# Patient Record
Sex: MALE | Race: WHITE | Employment: FULL TIME | URBAN - METROPOLITAN AREA
[De-identification: names, ages, dates, MRNs, and addresses within clinical notes are randomized per-mention and may not be internally consistent; named-entity substitution may affect disease eponyms.]

---

## 2020-03-25 ENCOUNTER — APPOINTMENT (OUTPATIENT)
Dept: GENERAL RADIOLOGY | Age: 45
DRG: 199 | End: 2020-03-25
Payer: COMMERCIAL

## 2020-03-25 ENCOUNTER — APPOINTMENT (OUTPATIENT)
Dept: CT IMAGING | Age: 45
DRG: 199 | End: 2020-03-25
Payer: COMMERCIAL

## 2020-03-25 ENCOUNTER — HOSPITAL ENCOUNTER (INPATIENT)
Age: 45
LOS: 4 days | Discharge: HOME | DRG: 199 | End: 2020-03-29
Attending: EMERGENCY MEDICINE | Admitting: SURGERY
Payer: COMMERCIAL

## 2020-03-25 PROBLEM — S42.302G: Status: ACTIVE | Noted: 2020-03-25

## 2020-03-25 PROBLEM — S22.42XD CLOSED FRACTURE OF MULTIPLE RIBS OF LEFT SIDE WITH ROUTINE HEALING: Status: ACTIVE | Noted: 2020-03-25

## 2020-03-25 PROBLEM — S22.41XD MULTIPLE FRACTURES OF RIBS OF RIGHT SIDE WITH ROUTINE HEALING: Status: ACTIVE | Noted: 2020-03-25

## 2020-03-25 PROBLEM — S22.49XG: Status: ACTIVE | Noted: 2020-03-25

## 2020-03-25 PROBLEM — S42.022A CLOSED DISPLACED FRACTURE OF SHAFT OF LEFT CLAVICLE: Status: ACTIVE | Noted: 2020-03-25

## 2020-03-25 LAB
ABO/RH: NORMAL
ALBUMIN SERPL-MCNC: 4.4 G/DL (ref 3.5–5.2)
ALP BLD-CCNC: 91 U/L (ref 40–129)
ALT SERPL-CCNC: 175 U/L (ref 0–40)
ANION GAP SERPL CALCULATED.3IONS-SCNC: 17 MMOL/L (ref 7–16)
ANTIBODY SCREEN: NORMAL
APTT: 30.9 SEC (ref 24.5–35.1)
AST SERPL-CCNC: 145 U/L (ref 0–39)
BASOPHILS ABSOLUTE: 0.09 E9/L (ref 0–0.2)
BASOPHILS RELATIVE PERCENT: 0.8 % (ref 0–2)
BILIRUB SERPL-MCNC: 0.4 MG/DL (ref 0–1.2)
BUN BLDV-MCNC: 8 MG/DL (ref 6–20)
CALCIUM SERPL-MCNC: 8.8 MG/DL (ref 8.6–10.2)
CHLORIDE BLD-SCNC: 105 MMOL/L (ref 98–107)
CO2: 21 MMOL/L (ref 22–29)
CREAT SERPL-MCNC: 1.1 MG/DL (ref 0.7–1.2)
EOSINOPHILS ABSOLUTE: 0.16 E9/L (ref 0.05–0.5)
EOSINOPHILS RELATIVE PERCENT: 1.4 % (ref 0–6)
GFR AFRICAN AMERICAN: >60
GFR NON-AFRICAN AMERICAN: >60 ML/MIN/1.73
GLUCOSE BLD-MCNC: 127 MG/DL (ref 74–99)
HCT VFR BLD CALC: 53.4 % (ref 37–54)
HEMOGLOBIN: 17.8 G/DL (ref 12.5–16.5)
IMMATURE GRANULOCYTES #: 0.16 E9/L
IMMATURE GRANULOCYTES %: 1.4 % (ref 0–5)
INR BLD: 1
LACTIC ACID: 2.6 MMOL/L (ref 0.5–2.2)
LIPASE: 38 U/L (ref 13–60)
LYMPHOCYTES ABSOLUTE: 3.8 E9/L (ref 1.5–4)
LYMPHOCYTES RELATIVE PERCENT: 33.6 % (ref 20–42)
MCH RBC QN AUTO: 32.1 PG (ref 26–35)
MCHC RBC AUTO-ENTMCNC: 33.3 % (ref 32–34.5)
MCV RBC AUTO: 96.4 FL (ref 80–99.9)
MONOCYTES ABSOLUTE: 0.72 E9/L (ref 0.1–0.95)
MONOCYTES RELATIVE PERCENT: 6.4 % (ref 2–12)
NEUTROPHILS ABSOLUTE: 6.37 E9/L (ref 1.8–7.3)
NEUTROPHILS RELATIVE PERCENT: 56.4 % (ref 43–80)
PDW BLD-RTO: 12.5 FL (ref 11.5–15)
PLATELET # BLD: 222 E9/L (ref 130–450)
PMV BLD AUTO: 10.4 FL (ref 7–12)
POTASSIUM SERPL-SCNC: 3.8 MMOL/L (ref 3.5–5)
PROTHROMBIN TIME: 11.4 SEC (ref 9.3–12.4)
RBC # BLD: 5.54 E12/L (ref 3.8–5.8)
SODIUM BLD-SCNC: 143 MMOL/L (ref 132–146)
TOTAL CK: 258 U/L (ref 20–200)
TOTAL PROTEIN: 7.2 G/DL (ref 6.4–8.3)
WBC # BLD: 11.3 E9/L (ref 4.5–11.5)

## 2020-03-25 PROCEDURE — 72128 CT CHEST SPINE W/O DYE: CPT

## 2020-03-25 PROCEDURE — G0480 DRUG TEST DEF 1-7 CLASSES: HCPCS

## 2020-03-25 PROCEDURE — 71260 CT THORAX DX C+: CPT

## 2020-03-25 PROCEDURE — 1200000000 HC SEMI PRIVATE

## 2020-03-25 PROCEDURE — 6360000002 HC RX W HCPCS: Performed by: EMERGENCY MEDICINE

## 2020-03-25 PROCEDURE — 94760 N-INVAS EAR/PLS OXIMETRY 1: CPT

## 2020-03-25 PROCEDURE — 96375 TX/PRO/DX INJ NEW DRUG ADDON: CPT

## 2020-03-25 PROCEDURE — 72125 CT NECK SPINE W/O DYE: CPT

## 2020-03-25 PROCEDURE — 85610 PROTHROMBIN TIME: CPT

## 2020-03-25 PROCEDURE — 72131 CT LUMBAR SPINE W/O DYE: CPT

## 2020-03-25 PROCEDURE — 82550 ASSAY OF CK (CPK): CPT

## 2020-03-25 PROCEDURE — 99291 CRITICAL CARE FIRST HOUR: CPT

## 2020-03-25 PROCEDURE — 86900 BLOOD TYPING SEROLOGIC ABO: CPT

## 2020-03-25 PROCEDURE — 85025 COMPLETE CBC W/AUTO DIFF WBC: CPT

## 2020-03-25 PROCEDURE — 2580000003 HC RX 258: Performed by: STUDENT IN AN ORGANIZED HEALTH CARE EDUCATION/TRAINING PROGRAM

## 2020-03-25 PROCEDURE — 6360000002 HC RX W HCPCS: Performed by: STUDENT IN AN ORGANIZED HEALTH CARE EDUCATION/TRAINING PROGRAM

## 2020-03-25 PROCEDURE — 83690 ASSAY OF LIPASE: CPT

## 2020-03-25 PROCEDURE — 6370000000 HC RX 637 (ALT 250 FOR IP): Performed by: SURGERY

## 2020-03-25 PROCEDURE — 6360000004 HC RX CONTRAST MEDICATION: Performed by: RADIOLOGY

## 2020-03-25 PROCEDURE — 74177 CT ABD & PELVIS W/CONTRAST: CPT

## 2020-03-25 PROCEDURE — 72170 X-RAY EXAM OF PELVIS: CPT

## 2020-03-25 PROCEDURE — 12002 RPR S/N/AX/GEN/TRNK2.6-7.5CM: CPT

## 2020-03-25 PROCEDURE — 73000 X-RAY EXAM OF COLLAR BONE: CPT

## 2020-03-25 PROCEDURE — 90715 TDAP VACCINE 7 YRS/> IM: CPT | Performed by: STUDENT IN AN ORGANIZED HEALTH CARE EDUCATION/TRAINING PROGRAM

## 2020-03-25 PROCEDURE — G0390 TRAUMA RESPONS W/HOSP CRITI: HCPCS

## 2020-03-25 PROCEDURE — 70450 CT HEAD/BRAIN W/O DYE: CPT

## 2020-03-25 PROCEDURE — 86901 BLOOD TYPING SEROLOGIC RH(D): CPT

## 2020-03-25 PROCEDURE — 90471 IMMUNIZATION ADMIN: CPT | Performed by: STUDENT IN AN ORGANIZED HEALTH CARE EDUCATION/TRAINING PROGRAM

## 2020-03-25 PROCEDURE — 83605 ASSAY OF LACTIC ACID: CPT

## 2020-03-25 PROCEDURE — 71045 X-RAY EXAM CHEST 1 VIEW: CPT

## 2020-03-25 PROCEDURE — 86850 RBC ANTIBODY SCREEN: CPT

## 2020-03-25 PROCEDURE — 80053 COMPREHEN METABOLIC PANEL: CPT

## 2020-03-25 PROCEDURE — 6360000002 HC RX W HCPCS: Performed by: SURGERY

## 2020-03-25 PROCEDURE — 96374 THER/PROPH/DIAG INJ IV PUSH: CPT

## 2020-03-25 PROCEDURE — 99285 EMERGENCY DEPT VISIT HI MDM: CPT

## 2020-03-25 PROCEDURE — 85730 THROMBOPLASTIN TIME PARTIAL: CPT

## 2020-03-25 PROCEDURE — 80307 DRUG TEST PRSMV CHEM ANLYZR: CPT

## 2020-03-25 PROCEDURE — 6830039000 HC L3 TRAUMA ALERT

## 2020-03-25 RX ORDER — 0.9 % SODIUM CHLORIDE 0.9 %
1000 INTRAVENOUS SOLUTION INTRAVENOUS ONCE
Status: COMPLETED | OUTPATIENT
Start: 2020-03-25 | End: 2020-03-25

## 2020-03-25 RX ORDER — HYDROMORPHONE HYDROCHLORIDE 1 MG/ML
1 INJECTION, SOLUTION INTRAMUSCULAR; INTRAVENOUS; SUBCUTANEOUS ONCE
Status: COMPLETED | OUTPATIENT
Start: 2020-03-25 | End: 2020-03-25

## 2020-03-25 RX ORDER — MORPHINE SULFATE 4 MG/ML
4 INJECTION, SOLUTION INTRAMUSCULAR; INTRAVENOUS
Status: DISCONTINUED | OUTPATIENT
Start: 2020-03-25 | End: 2020-03-27

## 2020-03-25 RX ORDER — KETOROLAC TROMETHAMINE 30 MG/ML
30 INJECTION, SOLUTION INTRAMUSCULAR; INTRAVENOUS EVERY 6 HOURS
Status: DISCONTINUED | OUTPATIENT
Start: 2020-03-25 | End: 2020-03-29 | Stop reason: HOSPADM

## 2020-03-25 RX ORDER — LIDOCAINE 4 G/G
1 PATCH TOPICAL EVERY 12 HOURS
Status: DISCONTINUED | OUTPATIENT
Start: 2020-03-25 | End: 2020-03-29 | Stop reason: HOSPADM

## 2020-03-25 RX ORDER — SODIUM CHLORIDE 0.9 % (FLUSH) 0.9 %
10 SYRINGE (ML) INJECTION EVERY 12 HOURS SCHEDULED
Status: DISCONTINUED | OUTPATIENT
Start: 2020-03-25 | End: 2020-03-29 | Stop reason: HOSPADM

## 2020-03-25 RX ORDER — FENTANYL CITRATE 50 UG/ML
50 INJECTION, SOLUTION INTRAMUSCULAR; INTRAVENOUS ONCE
Status: COMPLETED | OUTPATIENT
Start: 2020-03-25 | End: 2020-03-25

## 2020-03-25 RX ORDER — SODIUM CHLORIDE 0.9 % (FLUSH) 0.9 %
SYRINGE (ML) INJECTION
Status: DISPENSED
Start: 2020-03-25 | End: 2020-03-26

## 2020-03-25 RX ORDER — MORPHINE SULFATE 2 MG/ML
2 INJECTION, SOLUTION INTRAMUSCULAR; INTRAVENOUS
Status: DISCONTINUED | OUTPATIENT
Start: 2020-03-25 | End: 2020-03-27

## 2020-03-25 RX ORDER — OXYCODONE HYDROCHLORIDE 5 MG/1
10 TABLET ORAL EVERY 4 HOURS PRN
Status: DISCONTINUED | OUTPATIENT
Start: 2020-03-25 | End: 2020-03-29 | Stop reason: HOSPADM

## 2020-03-25 RX ORDER — OXYCODONE HYDROCHLORIDE 5 MG/1
5 TABLET ORAL EVERY 4 HOURS PRN
Status: DISCONTINUED | OUTPATIENT
Start: 2020-03-25 | End: 2020-03-29 | Stop reason: HOSPADM

## 2020-03-25 RX ORDER — SODIUM CHLORIDE 0.9 % (FLUSH) 0.9 %
10 SYRINGE (ML) INJECTION PRN
Status: DISCONTINUED | OUTPATIENT
Start: 2020-03-25 | End: 2020-03-29 | Stop reason: HOSPADM

## 2020-03-25 RX ORDER — MORPHINE SULFATE 10 MG/ML
8 INJECTION, SOLUTION INTRAMUSCULAR; INTRAVENOUS ONCE
Status: COMPLETED | OUTPATIENT
Start: 2020-03-25 | End: 2020-03-25

## 2020-03-25 RX ORDER — ONDANSETRON 2 MG/ML
4 INJECTION INTRAMUSCULAR; INTRAVENOUS EVERY 6 HOURS PRN
Status: DISCONTINUED | OUTPATIENT
Start: 2020-03-25 | End: 2020-03-29 | Stop reason: HOSPADM

## 2020-03-25 RX ORDER — IPRATROPIUM BROMIDE AND ALBUTEROL SULFATE 2.5; .5 MG/3ML; MG/3ML
1 SOLUTION RESPIRATORY (INHALATION) EVERY 4 HOURS PRN
Status: DISCONTINUED | OUTPATIENT
Start: 2020-03-25 | End: 2020-03-27

## 2020-03-25 RX ADMIN — IOPAMIDOL 80 ML: 755 INJECTION, SOLUTION INTRAVENOUS at 19:42

## 2020-03-25 RX ADMIN — MORPHINE SULFATE 4 MG: 4 INJECTION, SOLUTION INTRAMUSCULAR; INTRAVENOUS at 23:50

## 2020-03-25 RX ADMIN — FENTANYL CITRATE 50 MCG: 50 INJECTION, SOLUTION INTRAMUSCULAR; INTRAVENOUS at 19:02

## 2020-03-25 RX ADMIN — SODIUM CHLORIDE 1000 ML: 9 INJECTION, SOLUTION INTRAVENOUS at 19:03

## 2020-03-25 RX ADMIN — HYDROMORPHONE HYDROCHLORIDE 1 MG: 1 INJECTION, SOLUTION INTRAMUSCULAR; INTRAVENOUS; SUBCUTANEOUS at 21:29

## 2020-03-25 RX ADMIN — MORPHINE SULFATE 8 MG: 10 INJECTION INTRAVENOUS at 21:05

## 2020-03-25 RX ADMIN — OXYCODONE 10 MG: 5 TABLET ORAL at 22:30

## 2020-03-25 RX ADMIN — KETOROLAC TROMETHAMINE 30 MG: 30 INJECTION, SOLUTION INTRAMUSCULAR; INTRAVENOUS at 23:50

## 2020-03-25 RX ADMIN — TETANUS TOXOID, REDUCED DIPHTHERIA TOXOID AND ACELLULAR PERTUSSIS VACCINE, ADSORBED 0.5 ML: 5; 2.5; 8; 8; 2.5 SUSPENSION INTRAMUSCULAR at 19:09

## 2020-03-25 ASSESSMENT — ENCOUNTER SYMPTOMS
EYE REDNESS: 0
SINUS PRESSURE: 0
EYE DISCHARGE: 0
SORE THROAT: 0
VOMITING: 0
NAUSEA: 0
SHORTNESS OF BREATH: 0
COUGH: 0
DIARRHEA: 0
EYE PAIN: 0
ABDOMINAL PAIN: 0
BACK PAIN: 1
WHEEZING: 0

## 2020-03-25 ASSESSMENT — PAIN SCALES - GENERAL
PAINLEVEL_OUTOF10: 7
PAINLEVEL_OUTOF10: 10
PAINLEVEL_OUTOF10: 4
PAINLEVEL_OUTOF10: 10
PAINLEVEL_OUTOF10: 5
PAINLEVEL_OUTOF10: 10
PAINLEVEL_OUTOF10: 7

## 2020-03-25 ASSESSMENT — PAIN DESCRIPTION - ONSET: ONSET: GRADUAL

## 2020-03-25 ASSESSMENT — PAIN DESCRIPTION - ORIENTATION: ORIENTATION: LEFT

## 2020-03-25 ASSESSMENT — PAIN DESCRIPTION - PAIN TYPE: TYPE: ACUTE PAIN

## 2020-03-25 ASSESSMENT — PAIN - FUNCTIONAL ASSESSMENT: PAIN_FUNCTIONAL_ASSESSMENT: PREVENTS OR INTERFERES SOME ACTIVE ACTIVITIES AND ADLS

## 2020-03-25 ASSESSMENT — PAIN DESCRIPTION - PROGRESSION: CLINICAL_PROGRESSION: NOT CHANGED

## 2020-03-25 ASSESSMENT — PAIN DESCRIPTION - FREQUENCY: FREQUENCY: CONTINUOUS

## 2020-03-25 ASSESSMENT — PAIN DESCRIPTION - LOCATION: LOCATION: RIB CAGE

## 2020-03-25 NOTE — ED NOTES
Trauma Alert  Called  @ 0630   DR Kristine Ríos Chain CALL @ [address] [facility]  03/25/20 2765

## 2020-03-26 LAB
ACETAMINOPHEN LEVEL: <5 MCG/ML (ref 10–30)
ALBUMIN SERPL-MCNC: 4.4 G/DL (ref 3.5–5.2)
ALP BLD-CCNC: 84 U/L (ref 40–129)
ALT SERPL-CCNC: 140 U/L (ref 0–40)
AMPHETAMINE SCREEN, URINE: NOT DETECTED
ANION GAP SERPL CALCULATED.3IONS-SCNC: 13 MMOL/L (ref 7–16)
AST SERPL-CCNC: 86 U/L (ref 0–39)
BARBITURATE SCREEN URINE: NOT DETECTED
BENZODIAZEPINE SCREEN, URINE: NOT DETECTED
BILIRUB SERPL-MCNC: 1 MG/DL (ref 0–1.2)
BUN BLDV-MCNC: 11 MG/DL (ref 6–20)
CALCIUM SERPL-MCNC: 8.8 MG/DL (ref 8.6–10.2)
CANNABINOID SCREEN URINE: NOT DETECTED
CHLORIDE BLD-SCNC: 101 MMOL/L (ref 98–107)
CO2: 24 MMOL/L (ref 22–29)
COCAINE METABOLITE SCREEN URINE: NOT DETECTED
CREAT SERPL-MCNC: 1.1 MG/DL (ref 0.7–1.2)
ETHANOL: 159 MG/DL (ref 0–0.08)
FENTANYL SCREEN, URINE: POSITIVE
GFR AFRICAN AMERICAN: >60
GFR NON-AFRICAN AMERICAN: >60 ML/MIN/1.73
GLUCOSE BLD-MCNC: 150 MG/DL (ref 74–99)
HCT VFR BLD CALC: 50.7 % (ref 37–54)
HEMOGLOBIN: 16.9 G/DL (ref 12.5–16.5)
Lab: ABNORMAL
MCH RBC QN AUTO: 32.7 PG (ref 26–35)
MCHC RBC AUTO-ENTMCNC: 33.3 % (ref 32–34.5)
MCV RBC AUTO: 98.1 FL (ref 80–99.9)
METHADONE SCREEN, URINE: NOT DETECTED
OPIATE SCREEN URINE: POSITIVE
OXYCODONE URINE: POSITIVE
PDW BLD-RTO: 12.8 FL (ref 11.5–15)
PHENCYCLIDINE SCREEN URINE: NOT DETECTED
PLATELET # BLD: 196 E9/L (ref 130–450)
PMV BLD AUTO: 10.2 FL (ref 7–12)
POTASSIUM REFLEX MAGNESIUM: 4.9 MMOL/L (ref 3.5–5)
RBC # BLD: 5.17 E12/L (ref 3.8–5.8)
SALICYLATE, SERUM: <0.3 MG/DL (ref 0–30)
SODIUM BLD-SCNC: 138 MMOL/L (ref 132–146)
TOTAL PROTEIN: 6.9 G/DL (ref 6.4–8.3)
TRICYCLIC ANTIDEPRESSANTS SCREEN SERUM: NEGATIVE NG/ML
WBC # BLD: 12.9 E9/L (ref 4.5–11.5)

## 2020-03-26 PROCEDURE — 6360000002 HC RX W HCPCS: Performed by: SURGERY

## 2020-03-26 PROCEDURE — 99223 1ST HOSP IP/OBS HIGH 75: CPT | Performed by: SURGERY

## 2020-03-26 PROCEDURE — 80053 COMPREHEN METABOLIC PANEL: CPT

## 2020-03-26 PROCEDURE — 36415 COLL VENOUS BLD VENIPUNCTURE: CPT

## 2020-03-26 PROCEDURE — 97535 SELF CARE MNGMENT TRAINING: CPT

## 2020-03-26 PROCEDURE — 94640 AIRWAY INHALATION TREATMENT: CPT

## 2020-03-26 PROCEDURE — 97165 OT EVAL LOW COMPLEX 30 MIN: CPT

## 2020-03-26 PROCEDURE — 6370000000 HC RX 637 (ALT 250 FOR IP): Performed by: INTERNAL MEDICINE

## 2020-03-26 PROCEDURE — 2580000003 HC RX 258: Performed by: SURGERY

## 2020-03-26 PROCEDURE — 1200000000 HC SEMI PRIVATE

## 2020-03-26 PROCEDURE — 97530 THERAPEUTIC ACTIVITIES: CPT

## 2020-03-26 PROCEDURE — 97116 GAIT TRAINING THERAPY: CPT | Performed by: PHYSICAL THERAPIST

## 2020-03-26 PROCEDURE — 85027 COMPLETE CBC AUTOMATED: CPT

## 2020-03-26 PROCEDURE — 97530 THERAPEUTIC ACTIVITIES: CPT | Performed by: PHYSICAL THERAPIST

## 2020-03-26 PROCEDURE — 80307 DRUG TEST PRSMV CHEM ANLYZR: CPT

## 2020-03-26 PROCEDURE — 2700000000 HC OXYGEN THERAPY PER DAY

## 2020-03-26 PROCEDURE — 6370000000 HC RX 637 (ALT 250 FOR IP): Performed by: SURGERY

## 2020-03-26 PROCEDURE — 97161 PT EVAL LOW COMPLEX 20 MIN: CPT | Performed by: PHYSICAL THERAPIST

## 2020-03-26 RX ORDER — BACITRACIN, NEOMYCIN, POLYMYXIN B 400; 3.5; 5 [USP'U]/G; MG/G; [USP'U]/G
OINTMENT TOPICAL 2 TIMES DAILY
Status: DISCONTINUED | OUTPATIENT
Start: 2020-03-26 | End: 2020-03-27 | Stop reason: CLARIF

## 2020-03-26 RX ADMIN — KETOROLAC TROMETHAMINE 30 MG: 30 INJECTION, SOLUTION INTRAMUSCULAR; INTRAVENOUS at 22:23

## 2020-03-26 RX ADMIN — OXYCODONE 10 MG: 5 TABLET ORAL at 15:50

## 2020-03-26 RX ADMIN — MORPHINE SULFATE 4 MG: 4 INJECTION, SOLUTION INTRAMUSCULAR; INTRAVENOUS at 03:51

## 2020-03-26 RX ADMIN — OXYCODONE 10 MG: 5 TABLET ORAL at 06:53

## 2020-03-26 RX ADMIN — KETOROLAC TROMETHAMINE 30 MG: 30 INJECTION, SOLUTION INTRAMUSCULAR; INTRAVENOUS at 04:25

## 2020-03-26 RX ADMIN — BACITRACIN ZINC, NEOMYCIN SULFATE, POLYMYXIN B SULFATE: 3.5; 5000; 4 OINTMENT TOPICAL at 15:02

## 2020-03-26 RX ADMIN — KETOROLAC TROMETHAMINE 30 MG: 30 INJECTION, SOLUTION INTRAMUSCULAR; INTRAVENOUS at 16:09

## 2020-03-26 RX ADMIN — MORPHINE SULFATE 4 MG: 4 INJECTION, SOLUTION INTRAMUSCULAR; INTRAVENOUS at 12:39

## 2020-03-26 RX ADMIN — Medication 10 ML: at 10:03

## 2020-03-26 RX ADMIN — MORPHINE SULFATE 4 MG: 4 INJECTION, SOLUTION INTRAMUSCULAR; INTRAVENOUS at 09:07

## 2020-03-26 RX ADMIN — KETOROLAC TROMETHAMINE 30 MG: 30 INJECTION, SOLUTION INTRAMUSCULAR; INTRAVENOUS at 09:52

## 2020-03-26 RX ADMIN — IPRATROPIUM BROMIDE AND ALBUTEROL SULFATE 1 AMPULE: .5; 3 SOLUTION RESPIRATORY (INHALATION) at 06:54

## 2020-03-26 RX ADMIN — MORPHINE SULFATE 4 MG: 4 INJECTION, SOLUTION INTRAMUSCULAR; INTRAVENOUS at 21:42

## 2020-03-26 RX ADMIN — BACITRACIN ZINC, NEOMYCIN SULFATE, POLYMYXIN B SULFATE: 3.5; 5000; 4 OINTMENT TOPICAL at 22:26

## 2020-03-26 RX ADMIN — ENOXAPARIN SODIUM 40 MG: 40 INJECTION SUBCUTANEOUS at 09:53

## 2020-03-26 RX ADMIN — OXYCODONE 10 MG: 5 TABLET ORAL at 02:33

## 2020-03-26 RX ADMIN — Medication 10 ML: at 00:02

## 2020-03-26 ASSESSMENT — PAIN SCALES - GENERAL
PAINLEVEL_OUTOF10: 9
PAINLEVEL_OUTOF10: 2
PAINLEVEL_OUTOF10: 3
PAINLEVEL_OUTOF10: 8
PAINLEVEL_OUTOF10: 2
PAINLEVEL_OUTOF10: 10
PAINLEVEL_OUTOF10: 8
PAINLEVEL_OUTOF10: 10
PAINLEVEL_OUTOF10: 10
PAINLEVEL_OUTOF10: 8
PAINLEVEL_OUTOF10: 8
PAINLEVEL_OUTOF10: 2
PAINLEVEL_OUTOF10: 10
PAINLEVEL_OUTOF10: 10
PAINLEVEL_OUTOF10: 7

## 2020-03-26 ASSESSMENT — PAIN - FUNCTIONAL ASSESSMENT
PAIN_FUNCTIONAL_ASSESSMENT: PREVENTS OR INTERFERES SOME ACTIVE ACTIVITIES AND ADLS
PAIN_FUNCTIONAL_ASSESSMENT: PREVENTS OR INTERFERES SOME ACTIVE ACTIVITIES AND ADLS

## 2020-03-26 ASSESSMENT — PAIN DESCRIPTION - ONSET
ONSET: ON-GOING
ONSET: ON-GOING

## 2020-03-26 ASSESSMENT — PAIN DESCRIPTION - PROGRESSION
CLINICAL_PROGRESSION: GRADUALLY WORSENING
CLINICAL_PROGRESSION: GRADUALLY WORSENING

## 2020-03-26 ASSESSMENT — PAIN DESCRIPTION - DESCRIPTORS
DESCRIPTORS: ACHING;DISCOMFORT;THROBBING
DESCRIPTORS: ACHING;THROBBING

## 2020-03-26 ASSESSMENT — PAIN DESCRIPTION - LOCATION
LOCATION: GENERALIZED
LOCATION: RIB CAGE

## 2020-03-26 ASSESSMENT — PAIN DESCRIPTION - FREQUENCY
FREQUENCY: CONTINUOUS
FREQUENCY: CONTINUOUS

## 2020-03-26 ASSESSMENT — PAIN DESCRIPTION - PAIN TYPE
TYPE: ACUTE PAIN
TYPE: ACUTE PAIN
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
## 2020-03-26 NOTE — PROGRESS NOTES
Physical Therapy Initial Evaluation    Room #:  0316/0316-01  Patient Name: Rachel Marrufo  YOB: 1975  MRN: [id number]    Referring Provider: Hollie Britton MD    Date of Service: 3/26/2020    Evaluating Physical Therapist:  Bridget Gimenez, PT #[id number]      Diagnosis:   Multiple fractures of ribs of right side with routine healing [S22.41XD]  Closed multiple fractures of left upper extremity with ribs with delayed healing [S42.302G, S22.49XG]  Multiple fractures of ribs of right side with routine healing [S22.41XD]  Closed multiple fractures of left upper extremity with ribs with delayed healing [S42.302G, S22.49XG]        Patient Active Problem List   Diagnosis    Closed fracture of multiple ribs of left side with routine healing    Closed displaced fracture of shaft of left clavicle    Multiple fractures of ribs of right side with routine healing    Closed multiple fractures of left upper extremity with ribs with delayed healing        Tentative placement recommendation: Home Health Physical Therapy if needed    Equipment recommendation: Shower chair     Prior Level of Function: Patient ambulated independently works, drives  Rehab Potential: good  for baseline    Past medical history:   History reviewed. No pertinent past medical history. History reviewed. No pertinent surgical history.     Precautions: falls , NWB (non-weight bearing) Left upper extremity, sling   Comminuted, mildly displaced left mid clavicle fracture,   Fractures of the left second through seventh ribs with a small   hemothorax and left lung contusion         SUBJECTIVE:    Social history: Patient lives with spouse  in a tri level home bed/bath up 5 steps with3 steps  to enter with [employer] owned: None,       [address] [facility]   How much difficulty turning over in bed?: A Lot  How much difficulty sitting down on / standing up from a chair with arms?: A Little  How be administered as needed. Frequency of treatments: Patient will be seen  twice  daily  for therapeutic exercise, functional retraining, endurance activities, balance exercises, family and patient education. Time in  934  Time out  1016    Total Treatment Time  30 minutes    Evaluation time includes thorough review of current medical information, gathering information on past medical history/social history and prior level of function, completion of standardized testing/informal observation of tasks, assessment of data, and development of Plan of care and goals.      CPT codes:  Low Complexity PT evaluation (36334)  Therapeutic activities (42021)   20 minutes  1 unit(s)  Gait Training (10429) 10 minutes 1 unit(s)    Corry Gamez PT
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
## 2020-03-26 NOTE — PLAN OF CARE
Problem: Pain:  Description: Pain management should include both nonpharmacologic and pharmacologic interventions.   Goal: Pain level will decrease  Description: Pain level will decrease  3/26/2020 1742 by Nikita Galo RN  Outcome: Met This Shift  Note: Rate pain less than 5  3/26/2020 1334 by Asuncion Irene RN  Outcome: Met This Shift

## 2020-03-26 NOTE — PROGRESS NOTES
session. Cognition: A&O: 4/4; Follows 2 step directions              Memory:  Good              Sequencing:  Good              Problem solving:  Good              Judgement/safety:  Good                Functional Assessment:    Initial Eval Status  Date: 3/26/20 Treatment Status  Date: 3/26/2020 STGs = LTGs  Time frame: 5-7 days   Feeding Set up  Due to LUE precautions. N/T  Independent   Grooming Minimal Assist   Due to LUE precautions.  Mod A to assist in washing blood out of hair with foam soap and shampoo cap around, but not over staples Independent    UB Dressing Moderate Assist   Donned gown; however did not thread L arm. N/T  Independent    LB Dressing Maximal Assist   Don/doff socks. Pt unable to reach feet due to pain. N/T Independent    Bathing Moderate Assist  Using wipes while sitting EOB. N/T Independent   Toileting Moderate Assist     N/T Independent    Bed Mobility  Supine to sit: Moderate Assist   Sit to supine: NT   Pt in chair at end of session. HOB elevated. Min A to support B LE's to supine Supine to sit: Independent   Sit to supine: Independent    Functional Transfers Minimal Assist   Sit<>stand  Bed, chair Ryland for sit to stand from chair; transfer around foot of bed to opposite side of bed with Min A with height of bed elevated and HOB upright  Independent    Functional Mobility Minimal Assist   Without AD  40ft x 2; pt appearing diaphoretic after ~20 ft. Returned to room. 12 ft with  Min A with no AD; pt slightly dizzy upon standing Independent    Balance Sitting:     Static:  Good    Dynamic:Fair  Standing: Fair Sitting:     Static:  Good    Dynamic:Fair  Standing: Fair  Sitting:     Static:  Good    Dynamic:Good  Standing: Good   Activity Tolerance Fair  Pt on 3L O2; pt reports he is unable to take deep breaths due to rib fx.    SpO2 upper 80s on RA at rest. Fair/fair minus; pt demo's SOB and donned O2 upon RTB; nsg aware  Good   Visual/  Perceptual Glasses: No  WFL               Comments: Patient cleared by nursing staff. Upon arrival pt seated in chair. Pt educated with regards to bed mobility, hand placement, safety awareness, grooming tasks. At end of session pt RTB with HOB elevated  with all lines and tubes intact, call light within reach. Overall, pt demonstrated fair independence and safety during completion of ADL/functional transfers/mobility tasks. Pt would benefit from continued skilled OT to increase safety and independence with completion of ADL/IADL tasks for functional independence and quality of life.     · Pt has made fair/fair minus progress towards set goals as noted through :  · Grooming tasks, transfer training, bed mobility, functional mobility; pt limited d/t dizziness and pain   · Continue with current plan of care    Treatment Time In: 1:24PM      Treatment Time Out: 1:47 PM            Treatment Charges: Mins Units   ADL/Home Mgt     31542 15 1   Thera Activities     39408 8 1   Ther Ex                 11064     Manual Therapy    [id number]     Neuro Re-ed         19228     Orthotic manage/training                               32253     Non Billable Time     Total Timed Treatment 23 [address] [facility], [address] [place] [address]
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
## 2020-03-27 ENCOUNTER — APPOINTMENT (OUTPATIENT)
Dept: GENERAL RADIOLOGY | Age: 45
DRG: 199 | End: 2020-03-27
Payer: COMMERCIAL

## 2020-03-27 LAB
BASOPHILS ABSOLUTE: 0.06 E9/L (ref 0–0.2)
BASOPHILS RELATIVE PERCENT: 0.7 % (ref 0–2)
EOSINOPHILS ABSOLUTE: 0.2 E9/L (ref 0.05–0.5)
EOSINOPHILS RELATIVE PERCENT: 2.4 % (ref 0–6)
HCT VFR BLD CALC: 44.4 % (ref 37–54)
HEMOGLOBIN: 14.2 G/DL (ref 12.5–16.5)
IMMATURE GRANULOCYTES #: 0.04 E9/L
IMMATURE GRANULOCYTES %: 0.5 % (ref 0–5)
LYMPHOCYTES ABSOLUTE: 1.57 E9/L (ref 1.5–4)
LYMPHOCYTES RELATIVE PERCENT: 18.5 % (ref 20–42)
MCH RBC QN AUTO: 31.7 PG (ref 26–35)
MCHC RBC AUTO-ENTMCNC: 32 % (ref 32–34.5)
MCV RBC AUTO: 99.1 FL (ref 80–99.9)
MONOCYTES ABSOLUTE: 1.02 E9/L (ref 0.1–0.95)
MONOCYTES RELATIVE PERCENT: 12 % (ref 2–12)
NEUTROPHILS ABSOLUTE: 5.61 E9/L (ref 1.8–7.3)
NEUTROPHILS RELATIVE PERCENT: 65.9 % (ref 43–80)
PDW BLD-RTO: 12.6 FL (ref 11.5–15)
PLATELET # BLD: 147 E9/L (ref 130–450)
PMV BLD AUTO: 10.9 FL (ref 7–12)
RBC # BLD: 4.48 E12/L (ref 3.8–5.8)
WBC # BLD: 8.5 E9/L (ref 4.5–11.5)

## 2020-03-27 PROCEDURE — 85025 COMPLETE CBC W/AUTO DIFF WBC: CPT

## 2020-03-27 PROCEDURE — 6370000000 HC RX 637 (ALT 250 FOR IP): Performed by: INTERNAL MEDICINE

## 2020-03-27 PROCEDURE — 6370000000 HC RX 637 (ALT 250 FOR IP): Performed by: SURGERY

## 2020-03-27 PROCEDURE — 71045 X-RAY EXAM CHEST 1 VIEW: CPT

## 2020-03-27 PROCEDURE — 99233 SBSQ HOSP IP/OBS HIGH 50: CPT | Performed by: SURGERY

## 2020-03-27 PROCEDURE — 36415 COLL VENOUS BLD VENIPUNCTURE: CPT

## 2020-03-27 PROCEDURE — 97530 THERAPEUTIC ACTIVITIES: CPT

## 2020-03-27 PROCEDURE — 2700000000 HC OXYGEN THERAPY PER DAY

## 2020-03-27 PROCEDURE — 1200000000 HC SEMI PRIVATE

## 2020-03-27 PROCEDURE — 94640 AIRWAY INHALATION TREATMENT: CPT

## 2020-03-27 PROCEDURE — 2580000003 HC RX 258: Performed by: SURGERY

## 2020-03-27 PROCEDURE — 6360000002 HC RX W HCPCS: Performed by: SURGERY

## 2020-03-27 RX ORDER — BACITRACIN, NEOMYCIN, POLYMYXIN B 400; 3.5; 5 [USP'U]/G; MG/G; [USP'U]/G
OINTMENT TOPICAL 2 TIMES DAILY
Status: DISCONTINUED | OUTPATIENT
Start: 2020-03-27 | End: 2020-03-29 | Stop reason: HOSPADM

## 2020-03-27 RX ORDER — HYDROMORPHONE HYDROCHLORIDE 1 MG/ML
0.5 INJECTION, SOLUTION INTRAMUSCULAR; INTRAVENOUS; SUBCUTANEOUS
Status: DISCONTINUED | OUTPATIENT
Start: 2020-03-27 | End: 2020-03-29 | Stop reason: HOSPADM

## 2020-03-27 RX ORDER — IPRATROPIUM BROMIDE AND ALBUTEROL SULFATE 2.5; .5 MG/3ML; MG/3ML
1 SOLUTION RESPIRATORY (INHALATION)
Status: DISCONTINUED | OUTPATIENT
Start: 2020-03-27 | End: 2020-03-29 | Stop reason: HOSPADM

## 2020-03-27 RX ORDER — HYDROMORPHONE HYDROCHLORIDE 1 MG/ML
1 INJECTION, SOLUTION INTRAMUSCULAR; INTRAVENOUS; SUBCUTANEOUS
Status: DISCONTINUED | OUTPATIENT
Start: 2020-03-27 | End: 2020-03-29 | Stop reason: HOSPADM

## 2020-03-27 RX ORDER — METHOCARBAMOL 500 MG/1
1000 TABLET, FILM COATED ORAL EVERY 8 HOURS PRN
Status: DISCONTINUED | OUTPATIENT
Start: 2020-03-27 | End: 2020-03-29 | Stop reason: HOSPADM

## 2020-03-27 RX ADMIN — HYDROMORPHONE HYDROCHLORIDE 1 MG: 1 INJECTION, SOLUTION INTRAMUSCULAR; INTRAVENOUS; SUBCUTANEOUS at 19:30

## 2020-03-27 RX ADMIN — MORPHINE SULFATE 4 MG: 4 INJECTION, SOLUTION INTRAMUSCULAR; INTRAVENOUS at 07:56

## 2020-03-27 RX ADMIN — KETOROLAC TROMETHAMINE 30 MG: 30 INJECTION, SOLUTION INTRAMUSCULAR; INTRAVENOUS at 23:23

## 2020-03-27 RX ADMIN — HYDROMORPHONE HYDROCHLORIDE 1 MG: 1 INJECTION, SOLUTION INTRAMUSCULAR; INTRAVENOUS; SUBCUTANEOUS at 22:48

## 2020-03-27 RX ADMIN — ENOXAPARIN SODIUM 40 MG: 40 INJECTION SUBCUTANEOUS at 14:46

## 2020-03-27 RX ADMIN — HYDROMORPHONE HYDROCHLORIDE 1 MG: 1 INJECTION, SOLUTION INTRAMUSCULAR; INTRAVENOUS; SUBCUTANEOUS at 10:04

## 2020-03-27 RX ADMIN — KETOROLAC TROMETHAMINE 30 MG: 30 INJECTION, SOLUTION INTRAMUSCULAR; INTRAVENOUS at 09:37

## 2020-03-27 RX ADMIN — Medication 10 ML: at 08:53

## 2020-03-27 RX ADMIN — IPRATROPIUM BROMIDE AND ALBUTEROL SULFATE 1 AMPULE: 2.5; .5 SOLUTION RESPIRATORY (INHALATION) at 19:24

## 2020-03-27 RX ADMIN — KETOROLAC TROMETHAMINE 30 MG: 30 INJECTION, SOLUTION INTRAMUSCULAR; INTRAVENOUS at 04:44

## 2020-03-27 RX ADMIN — OXYCODONE 10 MG: 5 TABLET ORAL at 21:38

## 2020-03-27 RX ADMIN — BACITRACIN ZINC, NEOMYCIN SULFATE, POLYMYXIN B SULFATE 1 G: 3.5; 5000; 4 OINTMENT TOPICAL at 10:00

## 2020-03-27 RX ADMIN — Medication 10 ML: at 22:48

## 2020-03-27 RX ADMIN — SODIUM CHLORIDE, PRESERVATIVE FREE 10 ML: 5 INJECTION INTRAVENOUS at 10:05

## 2020-03-27 RX ADMIN — KETOROLAC TROMETHAMINE 30 MG: 30 INJECTION, SOLUTION INTRAMUSCULAR; INTRAVENOUS at 16:01

## 2020-03-27 RX ADMIN — METHOCARBAMOL 1000 MG: 500 TABLET, FILM COATED ORAL at 19:41

## 2020-03-27 RX ADMIN — BACITRACIN ZINC, NEOMYCIN, POLYMYXIN B: 400; 3.5; 5 OINTMENT TOPICAL at 19:41

## 2020-03-27 RX ADMIN — OXYCODONE 10 MG: 5 TABLET ORAL at 09:01

## 2020-03-27 RX ADMIN — OXYCODONE 10 MG: 5 TABLET ORAL at 17:38

## 2020-03-27 RX ADMIN — IPRATROPIUM BROMIDE AND ALBUTEROL SULFATE 1 AMPULE: 2.5; .5 SOLUTION RESPIRATORY (INHALATION) at 15:04

## 2020-03-27 RX ADMIN — HYDROMORPHONE HYDROCHLORIDE 1 MG: 1 INJECTION, SOLUTION INTRAMUSCULAR; INTRAVENOUS; SUBCUTANEOUS at 14:32

## 2020-03-27 ASSESSMENT — PAIN SCALES - GENERAL
PAINLEVEL_OUTOF10: 10
PAINLEVEL_OUTOF10: 9
PAINLEVEL_OUTOF10: 5
PAINLEVEL_OUTOF10: 5
PAINLEVEL_OUTOF10: 10
PAINLEVEL_OUTOF10: 0
PAINLEVEL_OUTOF10: 10
PAINLEVEL_OUTOF10: 10
PAINLEVEL_OUTOF10: 2
PAINLEVEL_OUTOF10: 10
PAINLEVEL_OUTOF10: 3
PAINLEVEL_OUTOF10: 3
PAINLEVEL_OUTOF10: 7
PAINLEVEL_OUTOF10: 2
PAINLEVEL_OUTOF10: 6
PAINLEVEL_OUTOF10: 8
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
## 2020-03-28 PROCEDURE — 6360000002 HC RX W HCPCS: Performed by: SURGERY

## 2020-03-28 PROCEDURE — 6370000000 HC RX 637 (ALT 250 FOR IP): Performed by: SURGERY

## 2020-03-28 PROCEDURE — 99233 SBSQ HOSP IP/OBS HIGH 50: CPT | Performed by: SURGERY

## 2020-03-28 PROCEDURE — 94640 AIRWAY INHALATION TREATMENT: CPT

## 2020-03-28 PROCEDURE — 2700000000 HC OXYGEN THERAPY PER DAY

## 2020-03-28 PROCEDURE — 6370000000 HC RX 637 (ALT 250 FOR IP): Performed by: INTERNAL MEDICINE

## 2020-03-28 PROCEDURE — 2580000003 HC RX 258: Performed by: SURGERY

## 2020-03-28 PROCEDURE — 1200000000 HC SEMI PRIVATE

## 2020-03-28 RX ADMIN — OXYCODONE 10 MG: 5 TABLET ORAL at 14:16

## 2020-03-28 RX ADMIN — METHOCARBAMOL 1000 MG: 500 TABLET, FILM COATED ORAL at 03:47

## 2020-03-28 RX ADMIN — BACITRACIN ZINC, NEOMYCIN, POLYMYXIN B: 400; 3.5; 5 OINTMENT TOPICAL at 14:16

## 2020-03-28 RX ADMIN — HYDROMORPHONE HYDROCHLORIDE 1 MG: 1 INJECTION, SOLUTION INTRAMUSCULAR; INTRAVENOUS; SUBCUTANEOUS at 19:27

## 2020-03-28 RX ADMIN — METHOCARBAMOL 1000 MG: 500 TABLET, FILM COATED ORAL at 15:49

## 2020-03-28 RX ADMIN — BACITRACIN ZINC, NEOMYCIN, POLYMYXIN B: 400; 3.5; 5 OINTMENT TOPICAL at 21:29

## 2020-03-28 RX ADMIN — KETOROLAC TROMETHAMINE 30 MG: 30 INJECTION, SOLUTION INTRAMUSCULAR; INTRAVENOUS at 17:41

## 2020-03-28 RX ADMIN — IPRATROPIUM BROMIDE AND ALBUTEROL SULFATE 1 AMPULE: 2.5; .5 SOLUTION RESPIRATORY (INHALATION) at 06:47

## 2020-03-28 RX ADMIN — HYDROMORPHONE HYDROCHLORIDE 1 MG: 1 INJECTION, SOLUTION INTRAMUSCULAR; INTRAVENOUS; SUBCUTANEOUS at 02:31

## 2020-03-28 RX ADMIN — OXYCODONE 10 MG: 5 TABLET ORAL at 18:22

## 2020-03-28 RX ADMIN — IPRATROPIUM BROMIDE AND ALBUTEROL SULFATE 1 AMPULE: 2.5; .5 SOLUTION RESPIRATORY (INHALATION) at 09:45

## 2020-03-28 RX ADMIN — KETOROLAC TROMETHAMINE 30 MG: 30 INJECTION, SOLUTION INTRAMUSCULAR; INTRAVENOUS at 11:12

## 2020-03-28 RX ADMIN — ENOXAPARIN SODIUM 40 MG: 40 INJECTION SUBCUTANEOUS at 11:11

## 2020-03-28 RX ADMIN — IPRATROPIUM BROMIDE AND ALBUTEROL SULFATE 1 AMPULE: 2.5; .5 SOLUTION RESPIRATORY (INHALATION) at 17:23

## 2020-03-28 RX ADMIN — Medication 10 ML: at 21:29

## 2020-03-28 RX ADMIN — IPRATROPIUM BROMIDE AND ALBUTEROL SULFATE 1 AMPULE: 2.5; .5 SOLUTION RESPIRATORY (INHALATION) at 13:27

## 2020-03-28 RX ADMIN — Medication 10 ML: at 11:12

## 2020-03-28 RX ADMIN — KETOROLAC TROMETHAMINE 30 MG: 30 INJECTION, SOLUTION INTRAMUSCULAR; INTRAVENOUS at 21:28

## 2020-03-28 RX ADMIN — HYDROMORPHONE HYDROCHLORIDE 0.5 MG: 1 INJECTION, SOLUTION INTRAMUSCULAR; INTRAVENOUS; SUBCUTANEOUS at 15:49

## 2020-03-28 RX ADMIN — OXYCODONE 10 MG: 5 TABLET ORAL at 01:38

## 2020-03-28 RX ADMIN — MAGNESIUM CITRATE 296 ML: 1.75 LIQUID ORAL at 17:41

## 2020-03-28 RX ADMIN — KETOROLAC TROMETHAMINE 30 MG: 30 INJECTION, SOLUTION INTRAMUSCULAR; INTRAVENOUS at 04:41

## 2020-03-28 ASSESSMENT — PAIN DESCRIPTION - DESCRIPTORS: DESCRIPTORS: SHARP;SHOOTING

## 2020-03-28 ASSESSMENT — PAIN SCALES - GENERAL
PAINLEVEL_OUTOF10: 4
PAINLEVEL_OUTOF10: 10
PAINLEVEL_OUTOF10: 8
PAINLEVEL_OUTOF10: 8
PAINLEVEL_OUTOF10: 3
PAINLEVEL_OUTOF10: 10
PAINLEVEL_OUTOF10: 4
PAINLEVEL_OUTOF10: 3
PAINLEVEL_OUTOF10: 6
PAINLEVEL_OUTOF10: 8
PAINLEVEL_OUTOF10: 3
PAINLEVEL_OUTOF10: 5
PAINLEVEL_OUTOF10: 10
PAINLEVEL_OUTOF10: 4

## 2020-03-28 ASSESSMENT — PAIN DESCRIPTION - PROGRESSION: CLINICAL_PROGRESSION: GRADUALLY WORSENING

## 2020-03-28 ASSESSMENT — PAIN - FUNCTIONAL ASSESSMENT: PAIN_FUNCTIONAL_ASSESSMENT: PREVENTS OR INTERFERES SOME ACTIVE ACTIVITIES AND ADLS

## 2020-03-28 ASSESSMENT — PAIN DESCRIPTION - FREQUENCY: FREQUENCY: CONTINUOUS

## 2020-03-28 ASSESSMENT — PAIN DESCRIPTION - PAIN TYPE: TYPE: ACUTE PAIN

## 2020-03-28 ASSESSMENT — PAIN DESCRIPTION - ONSET: ONSET: ON-GOING

## 2020-03-28 ASSESSMENT — PAIN DESCRIPTION - ORIENTATION: ORIENTATION: LEFT

## 2020-03-28 ASSESSMENT — PAIN DESCRIPTION - LOCATION: LOCATION: CHEST

## 2020-03-28 NOTE — PROGRESS NOTES
Pulse ox was 94% on room air at rest.  Ambulated patient on room air. Oxygen saturation was 84-87% on room air while ambulating. Oxygen applied. Recovery pulse ox was 94% on 2 liters of oxygen while ambulating.
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
## 2020-03-29 VITALS
HEIGHT: 74 IN | HEART RATE: 87 BPM | DIASTOLIC BLOOD PRESSURE: 80 MMHG | TEMPERATURE: 97.7 F | SYSTOLIC BLOOD PRESSURE: 156 MMHG | BODY MASS INDEX: 33.37 KG/M2 | RESPIRATION RATE: 22 BRPM | WEIGHT: 260 LBS | OXYGEN SATURATION: 91 %

## 2020-03-29 LAB
ALBUMIN SERPL-MCNC: 3.6 G/DL (ref 3.5–5.2)
ALP BLD-CCNC: 70 U/L (ref 40–129)
ALT SERPL-CCNC: 52 U/L (ref 0–40)
AST SERPL-CCNC: 30 U/L (ref 0–39)
BILIRUB SERPL-MCNC: 1.1 MG/DL (ref 0–1.2)
BILIRUBIN DIRECT: 0.3 MG/DL (ref 0–0.3)
BILIRUBIN, INDIRECT: 0.8 MG/DL (ref 0–1)
TOTAL PROTEIN: 6.5 G/DL (ref 6.4–8.3)

## 2020-03-29 PROCEDURE — 99239 HOSP IP/OBS DSCHRG MGMT >30: CPT | Performed by: SURGERY

## 2020-03-29 PROCEDURE — 6370000000 HC RX 637 (ALT 250 FOR IP): Performed by: SURGERY

## 2020-03-29 PROCEDURE — 94640 AIRWAY INHALATION TREATMENT: CPT

## 2020-03-29 PROCEDURE — 36415 COLL VENOUS BLD VENIPUNCTURE: CPT

## 2020-03-29 PROCEDURE — 80076 HEPATIC FUNCTION PANEL: CPT

## 2020-03-29 PROCEDURE — 2700000000 HC OXYGEN THERAPY PER DAY

## 2020-03-29 PROCEDURE — 6360000002 HC RX W HCPCS: Performed by: SURGERY

## 2020-03-29 PROCEDURE — 97116 GAIT TRAINING THERAPY: CPT

## 2020-03-29 PROCEDURE — 2580000003 HC RX 258: Performed by: SURGERY

## 2020-03-29 PROCEDURE — 6370000000 HC RX 637 (ALT 250 FOR IP): Performed by: INTERNAL MEDICINE

## 2020-03-29 RX ORDER — OXYCODONE HYDROCHLORIDE 5 MG/1
5 TABLET ORAL EVERY 6 HOURS PRN
Qty: 28 TABLET | Refills: 0 | Status: SHIPPED | OUTPATIENT
Start: 2020-03-29 | End: 2020-03-29 | Stop reason: HOSPADM

## 2020-03-29 RX ORDER — IBUPROFEN 800 MG/1
800 TABLET ORAL EVERY 6 HOURS PRN
Qty: 20 TABLET | Refills: 0 | Status: ON HOLD | OUTPATIENT
Start: 2020-03-29 | End: 2020-05-14 | Stop reason: HOSPADM

## 2020-03-29 RX ORDER — IBUPROFEN 800 MG/1
800 TABLET ORAL EVERY 6 HOURS PRN
Qty: 20 TABLET | Refills: 0 | Status: SHIPPED | OUTPATIENT
Start: 2020-03-29 | End: 2020-03-29 | Stop reason: HOSPADM

## 2020-03-29 RX ORDER — OXYCODONE HYDROCHLORIDE 5 MG/1
5 TABLET ORAL EVERY 6 HOURS PRN
Qty: 28 TABLET | Refills: 0 | Status: SHIPPED | OUTPATIENT
Start: 2020-03-29 | End: 2020-04-05

## 2020-03-29 RX ADMIN — IPRATROPIUM BROMIDE AND ALBUTEROL SULFATE 1 AMPULE: 2.5; .5 SOLUTION RESPIRATORY (INHALATION) at 05:04

## 2020-03-29 RX ADMIN — IPRATROPIUM BROMIDE AND ALBUTEROL SULFATE 1 AMPULE: 2.5; .5 SOLUTION RESPIRATORY (INHALATION) at 09:16

## 2020-03-29 RX ADMIN — KETOROLAC TROMETHAMINE 30 MG: 30 INJECTION, SOLUTION INTRAMUSCULAR; INTRAVENOUS at 04:17

## 2020-03-29 RX ADMIN — Medication 10 ML: at 10:47

## 2020-03-29 RX ADMIN — ENOXAPARIN SODIUM 40 MG: 40 INJECTION SUBCUTANEOUS at 10:46

## 2020-03-29 RX ADMIN — KETOROLAC TROMETHAMINE 30 MG: 30 INJECTION, SOLUTION INTRAMUSCULAR; INTRAVENOUS at 10:46

## 2020-03-29 ASSESSMENT — PAIN SCALES - GENERAL
PAINLEVEL_OUTOF10: 8
PAINLEVEL_OUTOF10: 4
PAINLEVEL_OUTOF10: 0

## 2020-03-29 NOTE — CARE COORDINATION
Patient to be discharged on home oxygen. Orders faxed to [facility] at [facility].  Per [employer], patient has not met deductible and may be responsible for out of pocket cost if hospital stay not billed first.  [facility] to contact patient to discuss with him. If patient approves, oxygen will be delivered. Charge nurse informed.  Electronically signed by Lani Liz RN on 3/29/2020 at 12:00 PM

## 2020-03-29 NOTE — PROGRESS NOTES
safety, pacing, and technique. 5 steps 1 rail with supervision    ROM Within functional limits except Left upper extremitiy     Increase range of motion 10% of affected joints    Strength BUE: refer to OT eval  RLE:  4/5  LLE:  4/5   Increase strength in affected mm groups by 1/3 grade   Balance Sitting EOB:  good    Dynamic Standing:  fair + Sitting EOB:  good   Dynamic Standing:  fair  Sitting EOB:  good    Dynamic Standing: good       Patient is Alert & Oriented x person, place, time and situation and follows directions    Vitals:  Blood Pressure at rest: Not assessed Blood Pressure post session: Not assessed   Heart Rate at rest: Not assessed Heart Rate post session: Not assessed   SPO2 at rest: 91% without nasal cannula SPO2 post session: 87-89% during exertion; 91% after treatment without nasal cannula     Patient education  Patient was educated and facilitated on techniques to increase safety and independence with bed mobility, balance, functional transfers, and functional mobility. Patient was explained the the benefits of mobility and risks of immobility. Patient response to education:   Pt verbalized understanding Pt demonstrated skill Pt requires further education in this area    Yes Yes Yes      Treatment: Patient practiced and was instructed in the following treatment:      Patient stood from chair and ambulated in hallway. Patient performed stairs and ambulated back to room. Therapeutic Exercises: not performed     At end of session, patient was in chair with call light and phone within reach, all lines were intact, and nursing was notified. ASSESSMENT:  Patient was able to progress ambulation distance and perform stairs with no loss of balance noted. Patient was motivated to participate but required slow movement to avoid increased pain. Patient SpO2 would also drop with exertion where he needed standing rest breaks.     Patient would benefit from continued skilled Physical Therapy to improve functional independence and quality of life. PLAN:    Patient is making good progress towards established goals, will continue with current plan of care.       Time in: 8:54  Time out: 9:18    Total Treatment Time: 24 minutes    CPT codes:  Gait Training (35922) 24 minutes 2 unit(s)    Tiffanie Jaffe PTA   LIC# [id number]

## 2020-03-29 NOTE — PLAN OF CARE
Problem: Pain:  Goal: Pain level will decrease  Description: Pain level will decrease  3/29/2020 0940 by Jesus Blankenship RN  Outcome: Met This Shift  3/28/2020 2043 by Danilo Santos RN  Outcome: Met This Shift  Goal: Control of acute pain  Description: Control of acute pain  3/29/2020 0940 by Jesus Blankenship RN  Outcome: Met This Shift  3/28/2020 2043 by Danilo Santos RN  Outcome: Met This Shift     Problem: Falls - Risk of:  Goal: Will remain free from falls  Description: Will remain free from falls  3/29/2020 0940 by Jesus Blankenship RN  Outcome: Met This Shift  3/28/2020 2043 by Danilo Santos RN  Outcome: Met This Shift  Goal: Absence of physical injury  Description: Absence of physical injury  3/29/2020 0940 by Jesus Blankenship RN  Outcome: Met This Shift  3/28/2020 2043 by Danilo Santos RN  Outcome: Met This Shift

## 2020-03-29 NOTE — DISCHARGE SUMMARY
tolerated  Diet: regular diet  Deep breathing and cough with pulmonary hygiene    Signed:  Bhavik Moe  3/29/2020  10:21 AM
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
## 2020-04-06 ENCOUNTER — OFFICE VISIT (OUTPATIENT)
Dept: ORTHOPEDIC SURGERY | Age: 45
End: 2020-04-06
Payer: COMMERCIAL

## 2020-04-06 VITALS — BODY MASS INDEX: 33.37 KG/M2 | WEIGHT: 260 LBS | HEIGHT: 74 IN | TEMPERATURE: 98 F

## 2020-04-06 PROCEDURE — 99213 OFFICE O/P EST LOW 20 MIN: CPT | Performed by: ORTHOPAEDIC SURGERY
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
## 2020-05-04 ENCOUNTER — OFFICE VISIT (OUTPATIENT)
Dept: ORTHOPEDIC SURGERY | Age: 45
End: 2020-05-04
Payer: COMMERCIAL

## 2020-05-04 VITALS — TEMPERATURE: 98 F | WEIGHT: 260 LBS | HEIGHT: 74 IN | BODY MASS INDEX: 33.37 KG/M2

## 2020-05-04 PROCEDURE — 99213 OFFICE O/P EST LOW 20 MIN: CPT | Performed by: ORTHOPAEDIC SURGERY

## 2020-05-13 ENCOUNTER — ANESTHESIA EVENT (OUTPATIENT)
Dept: OPERATING ROOM | Age: 45
End: 2020-05-13
Payer: COMMERCIAL

## 2020-05-13 NOTE — PROGRESS NOTES
[facility]        Date: 5/13/2020    Date of surgery:  5/14/2020   Arrival Time: Hospital will call you between 5pm and 7pm with your final arrival time for surgery    1. Do not eat or drink anything after  Midnight  prior to surgery. This includes no water, chewing gum, mints or ice chips. 2. Take the following medications with a small sip of water on the morning of Surgery:      3. Diabetics may take evening dose of insulin but none after midnight. If you feel symptomatic or low blood sugar morning of surgery drink 1-2 ounces of apple juice only. 4. Aspirin, Ibuprofen, Advil, Naproxen, Vitamin E and other Anti-inflammatory products should be stopped  before surgery  as directed by your physician. Take Tylenol only unless instructed otherwise by your surgeon. 5. Check with your Doctor regarding stopping Plavix, Coumadin, Lovenox, Eliquis, Effient, or other blood thinners. 6. Do not smoke,use illicit drugs and do not drink any alcoholic beverages 24 hours prior to surgery. 7. You may brush your teeth the morning of surgery. DO NOT SWALLOW WATER    8. You MUST make arrangements for a responsible adult to take you home after your surgery. You will not be allowed to leave alone or drive yourself home. It is strongly suggested someone stay with you the first 24 hrs. Your surgery will be cancelled if you do not have a ride home. 9. PEDIATRIC PATIENTS ONLY:  A parent/legal guardian must accompany a child scheduled for surgery and plan to stay at the hospital until the child is discharged. Please do not bring other children with you.     10. Please wear simple, loose fitting clothing to the hospital.  [facility] not bring valuables (money, credit cards, checkbooks, etc.) Do not wear any makeup (including no eye makeup) or nail polish on your fingers or toes. 11. DO NOT wear any jewelry or piercings on day of surgery. All body piercing jewelry must be removed. 12. Shower the night before surgery with __x_Antibacterial soap /LANE WIPES________    13. TOTAL JOINT REPLACEMENT/HYSTERECTOMY PATIENTS ONLY---Remember to bring Blood Bank bracelet to the hospital on the day of surgery. 14. If you have a Living Will and Durable Power of  for Healthcare, please bring in a copy. 15. If appropriate bring crutches, inspirex, WALKER, CANE etc... 12. Notify your Surgeon if you develop any illness between now and surgery time, cough, cold, fever, sore throat, nausea, vomiting, etc.  Please notify your surgeon if you experience dizziness, shortness of breath or blurred vision between now & the time of your surgery. 17. If you have ___dentures, they will be removed before going to the OR; we will provide you a container. If you wear ___contact lenses or ___glasses, they will be removed; please bring a case for them. 18. To provide excellent care visitors will be limited to 2 in the room at any given time. 19. Please bring picture ID and insurance card. 20. Sleep apnea patients need to bring CPAP AND SETTINGS to hospital on day of surgery. 21. During flu season no children under the age of 15 are permitted in the hospital for the safety of all patients. 22. Other                Please call AMBULATORY CARE if you have any further questions.    [address]     [address]

## 2020-05-14 ENCOUNTER — HOSPITAL ENCOUNTER (OUTPATIENT)
Dept: GENERAL RADIOLOGY | Age: 45
Setting detail: OUTPATIENT SURGERY
Discharge: HOME | End: 2020-05-16
Attending: ORTHOPAEDIC SURGERY
Payer: COMMERCIAL

## 2020-05-14 ENCOUNTER — ANESTHESIA (OUTPATIENT)
Dept: OPERATING ROOM | Age: 45
End: 2020-05-14
Payer: COMMERCIAL

## 2020-05-14 ENCOUNTER — HOSPITAL ENCOUNTER (OUTPATIENT)
Dept: GENERAL RADIOLOGY | Age: 45
Discharge: HOME | End: 2020-05-16
Payer: COMMERCIAL

## 2020-05-14 ENCOUNTER — HOSPITAL ENCOUNTER (OUTPATIENT)
Age: 45
Setting detail: OUTPATIENT SURGERY
Discharge: HOME | End: 2020-05-14
Attending: ORTHOPAEDIC SURGERY | Admitting: ORTHOPAEDIC SURGERY
Payer: COMMERCIAL

## 2020-05-14 VITALS
SYSTOLIC BLOOD PRESSURE: 129 MMHG | TEMPERATURE: 97.3 F | BODY MASS INDEX: 34.14 KG/M2 | RESPIRATION RATE: 16 BRPM | HEART RATE: 74 BPM | OXYGEN SATURATION: 94 % | WEIGHT: 266 LBS | HEIGHT: 74 IN | DIASTOLIC BLOOD PRESSURE: 67 MMHG

## 2020-05-14 VITALS
OXYGEN SATURATION: 90 % | RESPIRATION RATE: 2 BRPM | TEMPERATURE: 96.4 F | SYSTOLIC BLOOD PRESSURE: 111 MMHG | DIASTOLIC BLOOD PRESSURE: 70 MMHG

## 2020-05-14 PROCEDURE — 7100000010 HC PHASE II RECOVERY - FIRST 15 MIN: Performed by: ORTHOPAEDIC SURGERY

## 2020-05-14 PROCEDURE — 7100000001 HC PACU RECOVERY - ADDTL 15 MIN: Performed by: ORTHOPAEDIC SURGERY

## 2020-05-14 PROCEDURE — 3600000004 HC SURGERY LEVEL 4 BASE: Performed by: ORTHOPAEDIC SURGERY

## 2020-05-14 PROCEDURE — 7100000011 HC PHASE II RECOVERY - ADDTL 15 MIN: Performed by: ORTHOPAEDIC SURGERY

## 2020-05-14 PROCEDURE — 7100000000 HC PACU RECOVERY - FIRST 15 MIN: Performed by: ORTHOPAEDIC SURGERY

## 2020-05-14 PROCEDURE — 6360000002 HC RX W HCPCS

## 2020-05-14 PROCEDURE — C9359 IMPLNT,BON VOID FILLER-PUTTY: HCPCS | Performed by: ORTHOPAEDIC SURGERY

## 2020-05-14 PROCEDURE — L3650 SO 8 ABD RESTRAINT PRE OTS: HCPCS | Performed by: ORTHOPAEDIC SURGERY

## 2020-05-14 PROCEDURE — 2720000010 HC SURG SUPPLY STERILE: Performed by: ORTHOPAEDIC SURGERY

## 2020-05-14 PROCEDURE — 2500000003 HC RX 250 WO HCPCS: Performed by: ORTHOPAEDIC SURGERY

## 2020-05-14 PROCEDURE — C1713 ANCHOR/SCREW BN/BN,TIS/BN: HCPCS | Performed by: ORTHOPAEDIC SURGERY

## 2020-05-14 PROCEDURE — 2500000003 HC RX 250 WO HCPCS: Performed by: NURSE ANESTHETIST, CERTIFIED REGISTERED

## 2020-05-14 PROCEDURE — 3600000014 HC SURGERY LEVEL 4 ADDTL 15MIN: Performed by: ORTHOPAEDIC SURGERY

## 2020-05-14 PROCEDURE — 6360000002 HC RX W HCPCS: Performed by: NURSE ANESTHETIST, CERTIFIED REGISTERED

## 2020-05-14 PROCEDURE — 6360000002 HC RX W HCPCS: Performed by: ORTHOPAEDIC SURGERY

## 2020-05-14 PROCEDURE — 2709999900 HC NON-CHARGEABLE SUPPLY: Performed by: ORTHOPAEDIC SURGERY

## 2020-05-14 PROCEDURE — 73000 X-RAY EXAM OF COLLAR BONE: CPT

## 2020-05-14 PROCEDURE — 3700000001 HC ADD 15 MINUTES (ANESTHESIA): Performed by: ORTHOPAEDIC SURGERY

## 2020-05-14 PROCEDURE — 3700000000 HC ANESTHESIA ATTENDED CARE: Performed by: ORTHOPAEDIC SURGERY

## 2020-05-14 PROCEDURE — C1776 JOINT DEVICE (IMPLANTABLE): HCPCS | Performed by: ORTHOPAEDIC SURGERY

## 2020-05-14 PROCEDURE — 3209999900 FLUORO FOR SURGICAL PROCEDURES

## 2020-05-14 PROCEDURE — 2580000003 HC RX 258: Performed by: ANESTHESIOLOGY

## 2020-05-14 PROCEDURE — 2580000003 HC RX 258

## 2020-05-14 PROCEDURE — 2500000003 HC RX 250 WO HCPCS

## 2020-05-14 DEVICE — DBX PUTTY, 5CC
Type: IMPLANTABLE DEVICE | Site: CLAVICLE | Status: FUNCTIONAL
Brand: DBX®

## 2020-05-14 DEVICE — 3.5MM X 14MM NON-LOCKING HEXALOBE SCREW
Type: IMPLANTABLE DEVICE | Site: CLAVICLE | Status: FUNCTIONAL
Brand: ACUMED

## 2020-05-14 DEVICE — 3.5MM X 16MM NON-LOCKING HEXALOBE SCREW
Type: IMPLANTABLE DEVICE | Site: CLAVICLE | Status: FUNCTIONAL
Brand: ACUMED

## 2020-05-14 DEVICE — 3.5MM X 12MM LOCKING HEXALOBE SCREW
Type: IMPLANTABLE DEVICE | Site: CLAVICLE | Status: FUNCTIONAL
Brand: ACUMED

## 2020-05-14 DEVICE — 3.5MM X 16MM LOCKING HEXALOBE SCREW
Type: IMPLANTABLE DEVICE | Site: CLAVICLE | Status: FUNCTIONAL
Brand: ACUMED

## 2020-05-14 DEVICE — 3.5MM X 14MM LOCKING HEXALOBE SCREW
Type: IMPLANTABLE DEVICE | Site: CLAVICLE | Status: FUNCTIONAL
Brand: ACUMED

## 2020-05-14 DEVICE — LOW PROF CLAVICLE PLATE, 10-HOLE RT
Type: IMPLANTABLE DEVICE | Site: CLAVICLE | Status: FUNCTIONAL
Brand: ACUMED

## 2020-05-14 RX ORDER — BUPIVACAINE HYDROCHLORIDE AND EPINEPHRINE 2.5; 5 MG/ML; UG/ML
INJECTION, SOLUTION EPIDURAL; INFILTRATION; INTRACAUDAL; PERINEURAL PRN
Status: DISCONTINUED | OUTPATIENT
Start: 2020-05-14 | End: 2020-05-14 | Stop reason: ALTCHOICE

## 2020-05-14 RX ORDER — PROPOFOL 10 MG/ML
INJECTION, EMULSION INTRAVENOUS PRN
Status: DISCONTINUED | OUTPATIENT
Start: 2020-05-14 | End: 2020-05-14 | Stop reason: SDUPTHER

## 2020-05-14 RX ORDER — GLYCOPYRROLATE 1 MG/5 ML
SYRINGE (ML) INTRAVENOUS PRN
Status: DISCONTINUED | OUTPATIENT
Start: 2020-05-14 | End: 2020-05-14 | Stop reason: SDUPTHER

## 2020-05-14 RX ORDER — LABETALOL HYDROCHLORIDE 5 MG/ML
INJECTION, SOLUTION INTRAVENOUS PRN
Status: DISCONTINUED | OUTPATIENT
Start: 2020-05-14 | End: 2020-05-14 | Stop reason: SDUPTHER

## 2020-05-14 RX ORDER — MEPERIDINE HYDROCHLORIDE 25 MG/ML
12.5 INJECTION INTRAMUSCULAR; INTRAVENOUS; SUBCUTANEOUS EVERY 5 MIN PRN
Status: DISCONTINUED | OUTPATIENT
Start: 2020-05-14 | End: 2020-05-14 | Stop reason: HOSPADM

## 2020-05-14 RX ORDER — DEXAMETHASONE SODIUM PHOSPHATE 10 MG/ML
INJECTION, SOLUTION INTRAMUSCULAR; INTRAVENOUS PRN
Status: DISCONTINUED | OUTPATIENT
Start: 2020-05-14 | End: 2020-05-14 | Stop reason: SDUPTHER

## 2020-05-14 RX ORDER — SODIUM CHLORIDE 0.9 % (FLUSH) 0.9 %
10 SYRINGE (ML) INJECTION EVERY 12 HOURS SCHEDULED
Status: DISCONTINUED | OUTPATIENT
Start: 2020-05-14 | End: 2020-05-14 | Stop reason: HOSPADM

## 2020-05-14 RX ORDER — SODIUM CHLORIDE, SODIUM LACTATE, POTASSIUM CHLORIDE, CALCIUM CHLORIDE 600; 310; 30; 20 MG/100ML; MG/100ML; MG/100ML; MG/100ML
INJECTION, SOLUTION INTRAVENOUS CONTINUOUS PRN
Status: DISCONTINUED | OUTPATIENT
Start: 2020-05-14 | End: 2020-05-14 | Stop reason: SDUPTHER

## 2020-05-14 RX ORDER — HYDROCODONE BITARTRATE AND ACETAMINOPHEN 5; 325 MG/1; MG/1
1 TABLET ORAL EVERY 4 HOURS PRN
Qty: 42 TABLET | Refills: 0 | Status: SHIPPED | OUTPATIENT
Start: 2020-05-14 | End: 2020-05-21

## 2020-05-14 RX ORDER — MORPHINE SULFATE 1 MG/ML
INJECTION, SOLUTION EPIDURAL; INTRATHECAL; INTRAVENOUS PRN
Status: DISCONTINUED | OUTPATIENT
Start: 2020-05-14 | End: 2020-05-14 | Stop reason: SDUPTHER

## 2020-05-14 RX ORDER — NEOSTIGMINE METHYLSULFATE 1 MG/ML
INJECTION, SOLUTION INTRAVENOUS PRN
Status: DISCONTINUED | OUTPATIENT
Start: 2020-05-14 | End: 2020-05-14 | Stop reason: SDUPTHER

## 2020-05-14 RX ORDER — LIDOCAINE HYDROCHLORIDE 20 MG/ML
INJECTION, SOLUTION INTRAVENOUS PRN
Status: DISCONTINUED | OUTPATIENT
Start: 2020-05-14 | End: 2020-05-14 | Stop reason: SDUPTHER

## 2020-05-14 RX ORDER — ONDANSETRON 2 MG/ML
4 INJECTION INTRAMUSCULAR; INTRAVENOUS
Status: DISCONTINUED | OUTPATIENT
Start: 2020-05-14 | End: 2020-05-14 | Stop reason: HOSPADM

## 2020-05-14 RX ORDER — SODIUM CHLORIDE, SODIUM LACTATE, POTASSIUM CHLORIDE, CALCIUM CHLORIDE 600; 310; 30; 20 MG/100ML; MG/100ML; MG/100ML; MG/100ML
INJECTION, SOLUTION INTRAVENOUS CONTINUOUS
Status: DISCONTINUED | OUTPATIENT
Start: 2020-05-14 | End: 2020-05-14 | Stop reason: HOSPADM

## 2020-05-14 RX ORDER — PROMETHAZINE HYDROCHLORIDE 25 MG/ML
6.25 INJECTION, SOLUTION INTRAMUSCULAR; INTRAVENOUS
Status: DISCONTINUED | OUTPATIENT
Start: 2020-05-14 | End: 2020-05-14 | Stop reason: HOSPADM

## 2020-05-14 RX ORDER — ROCURONIUM BROMIDE 10 MG/ML
INJECTION, SOLUTION INTRAVENOUS PRN
Status: DISCONTINUED | OUTPATIENT
Start: 2020-05-14 | End: 2020-05-14 | Stop reason: SDUPTHER

## 2020-05-14 RX ORDER — FENTANYL CITRATE 50 UG/ML
INJECTION, SOLUTION INTRAMUSCULAR; INTRAVENOUS PRN
Status: DISCONTINUED | OUTPATIENT
Start: 2020-05-14 | End: 2020-05-14 | Stop reason: SDUPTHER

## 2020-05-14 RX ORDER — MIDAZOLAM HYDROCHLORIDE 1 MG/ML
INJECTION INTRAMUSCULAR; INTRAVENOUS PRN
Status: DISCONTINUED | OUTPATIENT
Start: 2020-05-14 | End: 2020-05-14 | Stop reason: SDUPTHER

## 2020-05-14 RX ORDER — LABETALOL HYDROCHLORIDE 5 MG/ML
5 INJECTION, SOLUTION INTRAVENOUS EVERY 10 MIN PRN
Status: DISCONTINUED | OUTPATIENT
Start: 2020-05-14 | End: 2020-05-14 | Stop reason: HOSPADM

## 2020-05-14 RX ORDER — HYDRALAZINE HYDROCHLORIDE 20 MG/ML
5 INJECTION INTRAMUSCULAR; INTRAVENOUS EVERY 10 MIN PRN
Status: DISCONTINUED | OUTPATIENT
Start: 2020-05-14 | End: 2020-05-14 | Stop reason: HOSPADM

## 2020-05-14 RX ORDER — CEFAZOLIN SODIUM 2 G/50ML
2 SOLUTION INTRAVENOUS
Status: COMPLETED | OUTPATIENT
Start: 2020-05-14 | End: 2020-05-14

## 2020-05-14 RX ORDER — SODIUM CHLORIDE 0.9 % (FLUSH) 0.9 %
10 SYRINGE (ML) INJECTION PRN
Status: DISCONTINUED | OUTPATIENT
Start: 2020-05-14 | End: 2020-05-14 | Stop reason: HOSPADM

## 2020-05-14 RX ORDER — HYDROCODONE BITARTRATE AND ACETAMINOPHEN 7.5; 325 MG/1; MG/1
1 TABLET ORAL EVERY 4 HOURS PRN
Status: DISCONTINUED | OUTPATIENT
Start: 2020-05-14 | End: 2020-05-14 | Stop reason: HOSPADM

## 2020-05-14 RX ADMIN — Medication 0.5 MG: at 17:00

## 2020-05-14 RX ADMIN — HYDROMORPHONE HYDROCHLORIDE 0.5 MG: 1 INJECTION, SOLUTION INTRAMUSCULAR; INTRAVENOUS; SUBCUTANEOUS at 16:35

## 2020-05-14 RX ADMIN — SODIUM CHLORIDE, POTASSIUM CHLORIDE, SODIUM LACTATE AND CALCIUM CHLORIDE: 600; 310; 30; 20 INJECTION, SOLUTION INTRAVENOUS at 15:11

## 2020-05-14 RX ADMIN — MORPHINE SULFATE 3 MG: 1 INJECTION, SOLUTION EPIDURAL; INTRATHECAL; INTRAVENOUS at 15:49

## 2020-05-14 RX ADMIN — PROPOFOL 200 MG: 10 INJECTION, EMULSION INTRAVENOUS at 14:26

## 2020-05-14 RX ADMIN — DEXAMETHASONE SODIUM PHOSPHATE 10 MG: 10 INJECTION, SOLUTION INTRAMUSCULAR; INTRAVENOUS at 14:39

## 2020-05-14 RX ADMIN — MORPHINE SULFATE 4 MG: 1 INJECTION, SOLUTION EPIDURAL; INTRATHECAL; INTRAVENOUS at 15:52

## 2020-05-14 RX ADMIN — Medication 2 MG: at 15:58

## 2020-05-14 RX ADMIN — LIDOCAINE HYDROCHLORIDE 80 MG: 20 INJECTION, SOLUTION INTRAVENOUS at 14:26

## 2020-05-14 RX ADMIN — FENTANYL CITRATE 150 MCG: 50 INJECTION, SOLUTION INTRAMUSCULAR; INTRAVENOUS at 14:26

## 2020-05-14 RX ADMIN — MORPHINE SULFATE 3 MG: 1 INJECTION, SOLUTION EPIDURAL; INTRATHECAL; INTRAVENOUS at 15:47

## 2020-05-14 RX ADMIN — FENTANYL CITRATE 50 MCG: 50 INJECTION, SOLUTION INTRAMUSCULAR; INTRAVENOUS at 15:02

## 2020-05-14 RX ADMIN — HYDROMORPHONE HYDROCHLORIDE 0.5 MG: 1 INJECTION, SOLUTION INTRAMUSCULAR; INTRAVENOUS; SUBCUTANEOUS at 17:00

## 2020-05-14 RX ADMIN — Medication 0.5 MG: at 16:45

## 2020-05-14 RX ADMIN — LABETALOL HYDROCHLORIDE 2.5 MG: 5 INJECTION INTRAVENOUS at 15:04

## 2020-05-14 RX ADMIN — FENTANYL CITRATE 50 MCG: 50 INJECTION, SOLUTION INTRAMUSCULAR; INTRAVENOUS at 14:30

## 2020-05-14 RX ADMIN — Medication 0.4 MG: at 15:58

## 2020-05-14 RX ADMIN — HYDROMORPHONE HYDROCHLORIDE 0.5 MG: 1 INJECTION, SOLUTION INTRAMUSCULAR; INTRAVENOUS; SUBCUTANEOUS at 16:45

## 2020-05-14 RX ADMIN — MIDAZOLAM 2 MG: 1 INJECTION INTRAMUSCULAR; INTRAVENOUS at 14:14

## 2020-05-14 RX ADMIN — ROCURONIUM BROMIDE 50 MG: 10 SOLUTION INTRAVENOUS at 14:26

## 2020-05-14 RX ADMIN — CEFAZOLIN SODIUM 2 G: 2 SOLUTION INTRAVENOUS at 14:03

## 2020-05-14 RX ADMIN — Medication 0.5 MG: at 16:35

## 2020-05-14 RX ADMIN — ROCURONIUM BROMIDE 25 MG: 10 SOLUTION INTRAVENOUS at 14:50

## 2020-05-14 RX ADMIN — Medication 1 MG: at 15:46

## 2020-05-14 RX ADMIN — SODIUM CHLORIDE, POTASSIUM CHLORIDE, SODIUM LACTATE AND CALCIUM CHLORIDE: 600; 310; 30; 20 INJECTION, SOLUTION INTRAVENOUS at 11:54

## 2020-05-14 RX ADMIN — Medication 0.2 MG: at 15:46

## 2020-05-14 RX ADMIN — SODIUM CHLORIDE, POTASSIUM CHLORIDE, SODIUM LACTATE AND CALCIUM CHLORIDE: 600; 310; 30; 20 INJECTION, SOLUTION INTRAVENOUS at 13:49

## 2020-05-14 RX ADMIN — ROCURONIUM BROMIDE 5 MG: 10 SOLUTION INTRAVENOUS at 15:35

## 2020-05-14 RX ADMIN — LIDOCAINE HYDROCHLORIDE 20 MG: 20 INJECTION, SOLUTION INTRAVENOUS at 14:30

## 2020-05-14 ASSESSMENT — PULMONARY FUNCTION TESTS
PIF_VALUE: 11
PIF_VALUE: 21
PIF_VALUE: 21
PIF_VALUE: 23
PIF_VALUE: 1
PIF_VALUE: 23
PIF_VALUE: 2
PIF_VALUE: 26
PIF_VALUE: 21
PIF_VALUE: 20
PIF_VALUE: 18
PIF_VALUE: 24
PIF_VALUE: 20
PIF_VALUE: 23
PIF_VALUE: 22
PIF_VALUE: 1
PIF_VALUE: 20
PIF_VALUE: 1
PIF_VALUE: 1
PIF_VALUE: 23
PIF_VALUE: 21
PIF_VALUE: 23
PIF_VALUE: 20
PIF_VALUE: 2
PIF_VALUE: 26
PIF_VALUE: 11
PIF_VALUE: 23
PIF_VALUE: 24
PIF_VALUE: 20
PIF_VALUE: 2
PIF_VALUE: 2
PIF_VALUE: 23
PIF_VALUE: 26
PIF_VALUE: 3
PIF_VALUE: 20
PIF_VALUE: 11
PIF_VALUE: 1
PIF_VALUE: 26
PIF_VALUE: 2
PIF_VALUE: 26
PIF_VALUE: 2
PIF_VALUE: 24
PIF_VALUE: 1
PIF_VALUE: 24
PIF_VALUE: 11
PIF_VALUE: 11
PIF_VALUE: 23
PIF_VALUE: 21
PIF_VALUE: 1
PIF_VALUE: 4
PIF_VALUE: 21
PIF_VALUE: 20
PIF_VALUE: 21
PIF_VALUE: 1
PIF_VALUE: 23
PIF_VALUE: 11
PIF_VALUE: 21
PIF_VALUE: 21
PIF_VALUE: 23
PIF_VALUE: 20
PIF_VALUE: 20
PIF_VALUE: 3
PIF_VALUE: 1
PIF_VALUE: 23
PIF_VALUE: 2
PIF_VALUE: 33
PIF_VALUE: 23
PIF_VALUE: 20
PIF_VALUE: 1
PIF_VALUE: 21
PIF_VALUE: 11
PIF_VALUE: 23
PIF_VALUE: 1
PIF_VALUE: 2
PIF_VALUE: 22
PIF_VALUE: 20
PIF_VALUE: 3
PIF_VALUE: 21
PIF_VALUE: 24
PIF_VALUE: 1
PIF_VALUE: 11
PIF_VALUE: 24
PIF_VALUE: 21
PIF_VALUE: 26
PIF_VALUE: 23
PIF_VALUE: 21
PIF_VALUE: 6
PIF_VALUE: 24
PIF_VALUE: 23
PIF_VALUE: 1
PIF_VALUE: 21
PIF_VALUE: 11
PIF_VALUE: 26
PIF_VALUE: 24
PIF_VALUE: 34
PIF_VALUE: 19
PIF_VALUE: 11
PIF_VALUE: 21
PIF_VALUE: 20
PIF_VALUE: 26
PIF_VALUE: 20
PIF_VALUE: 1
PIF_VALUE: 20
PIF_VALUE: 24
PIF_VALUE: 2
PIF_VALUE: 2
PIF_VALUE: 1
PIF_VALUE: 23
PIF_VALUE: 21
PIF_VALUE: 31
PIF_VALUE: 26
PIF_VALUE: 23
PIF_VALUE: 1
PIF_VALUE: 20
PIF_VALUE: 11
PIF_VALUE: 22
PIF_VALUE: 21
PIF_VALUE: 28
PIF_VALUE: 11
PIF_VALUE: 4
PIF_VALUE: 11

## 2020-05-14 ASSESSMENT — PAIN DESCRIPTION - PROGRESSION
CLINICAL_PROGRESSION: GRADUALLY IMPROVING
CLINICAL_PROGRESSION: NOT CHANGED
CLINICAL_PROGRESSION: GRADUALLY IMPROVING
CLINICAL_PROGRESSION: GRADUALLY IMPROVING
CLINICAL_PROGRESSION: GRADUALLY WORSENING

## 2020-05-14 ASSESSMENT — PAIN DESCRIPTION - PAIN TYPE
TYPE: SURGICAL PAIN

## 2020-05-14 ASSESSMENT — PAIN DESCRIPTION - LOCATION
LOCATION: SHOULDER
LOCATION: SHOULDER;OTHER (COMMENT)
LOCATION: SHOULDER;OTHER (COMMENT)
LOCATION: SHOULDER

## 2020-05-14 ASSESSMENT — PAIN DESCRIPTION - ORIENTATION
ORIENTATION: LEFT

## 2020-05-14 ASSESSMENT — PAIN DESCRIPTION - FREQUENCY
FREQUENCY: INTERMITTENT
FREQUENCY: CONTINUOUS
FREQUENCY: CONTINUOUS

## 2020-05-14 ASSESSMENT — PAIN SCALES - GENERAL
PAINLEVEL_OUTOF10: 7
PAINLEVEL_OUTOF10: 4
PAINLEVEL_OUTOF10: 0
PAINLEVEL_OUTOF10: 7
PAINLEVEL_OUTOF10: 7
PAINLEVEL_OUTOF10: 4
PAINLEVEL_OUTOF10: 2

## 2020-05-14 ASSESSMENT — PAIN - FUNCTIONAL ASSESSMENT
PAIN_FUNCTIONAL_ASSESSMENT: PREVENTS OR INTERFERES SOME ACTIVE ACTIVITIES AND ADLS
PAIN_FUNCTIONAL_ASSESSMENT: PREVENTS OR INTERFERES SOME ACTIVE ACTIVITIES AND ADLS
PAIN_FUNCTIONAL_ASSESSMENT: 0-10

## 2020-05-14 ASSESSMENT — PAIN DESCRIPTION - DESCRIPTORS
DESCRIPTORS: TENDER;SORE
DESCRIPTORS: SORE;TENDER
DESCRIPTORS: TENDER;SORE

## 2020-05-14 ASSESSMENT — PAIN DESCRIPTION - ONSET: ONSET: GRADUAL

## 2020-05-14 NOTE — PROGRESS NOTES
CLINICAL PHARMACY NOTE: MEDS TO [address] Select Patient?: No  Total # of Prescriptions Filled: 1   The following medications were delivered to the patient:  · Hydrocodone/apap 5-325 tablet  Total # of Interventions Completed: 3  Time Spent (min): 15    Additional Documentation:

## 2020-05-14 NOTE — ANESTHESIA PRE PROCEDURE
Department of Anesthesiology  Preprocedure Note       Name:  Abena Rios   Age:  53 y.o.  :  1975                                          MRN:  [id number]         Date:  2020      Surgeon: Darcy Duvall):  Christine Silveira MD    Procedure: Procedure(s):  OPEN REDUCTION INTERNAL FIXATION OF A LEFT CLAVICLE (ACCUMED)    Medications prior to admission:   Prior to Admission medications    Medication Sig Start Date End Date Taking? Authorizing Provider   ibuprofen (ADVIL;MOTRIN) 800 MG tablet Take 1 tablet by mouth every 6 hours as needed for Pain 3/29/20  Yes Desmond Mendoza MD   sertraline (ZOLOFT) 50 MG tablet Take 50 mg by mouth daily hasnt taken for 1 month   Yes Historical Provider, MD       Current medications:    Current Facility-Administered Medications   Medication Dose Route Frequency Provider Last Rate Last Dose    sodium chloride flush 0.9 % injection 10 mL  10 mL Intravenous 2 times per day Christine Silveira MD        sodium chloride flush 0.9 % injection 10 mL  10 mL Intravenous PRN Christine Silveira MD        ceFAZolin (ANCEF) 2 g in dextrose 3 % 50 mL IVPB (duplex)  2 g Intravenous On Call to [address] [place] [address], MD        lactated ringers infusion   Intravenous Continuous Vignseh Blanchard  mL/hr at 20 1154         Allergies:     Allergies   Allergen Reactions    Soybean-Containing Drug Products        Problem List:    Patient Active Problem List   Diagnosis Code    Closed fracture of multiple ribs of left side with routine healing S22.42XD    Closed displaced fracture of shaft of left clavicle S42.022A    Multiple fractures of ribs of right side with routine healing S22.41XD    Closed multiple fractures of left upper extremity with ribs with delayed healing S42.302G, S22.49XG       Past Medical History:        Diagnosis Date    Sleep apnea     never had sleep study       Past Surgical History:        Procedure Laterality Date    KNEE ARTHROSCOPY      SHOULDER

## 2020-05-15 NOTE — H&P
Blood pressure 129/67, pulse 74, temperature 97.3 °F (36.3 °C), temperature source Temporal, resp. rate 16, height 6' 2\" (1.88 m), weight 266 lb (120.7 kg), SpO2 94 %. The H and P from my office was reviewed   After being scanned into the chart. There is no significant change on the day of the surgery.

## 2022-03-08 DIAGNOSIS — M25.522 LEFT ELBOW PAIN: Primary | ICD-10-CM

## 2022-03-09 ENCOUNTER — HOSPITAL ENCOUNTER (OUTPATIENT)
Dept: MRI IMAGING | Age: 47
Discharge: HOME | End: 2022-03-11
Payer: COMMERCIAL

## 2022-03-09 ENCOUNTER — OFFICE VISIT (OUTPATIENT)
Dept: ORTHOPEDIC SURGERY | Age: 47
End: 2022-03-09
Payer: COMMERCIAL

## 2022-03-09 VITALS — BODY MASS INDEX: 34.14 KG/M2 | HEIGHT: 74 IN | TEMPERATURE: 98 F | WEIGHT: 266 LBS

## 2022-03-09 DIAGNOSIS — S46.212A RUPTURE OF LEFT DISTAL BICEPS TENDON, INITIAL ENCOUNTER: Primary | ICD-10-CM

## 2022-03-09 DIAGNOSIS — S46.212A RUPTURE OF LEFT DISTAL BICEPS TENDON, INITIAL ENCOUNTER: ICD-10-CM

## 2022-03-09 PROCEDURE — 99203 OFFICE O/P NEW LOW 30 MIN: CPT | Performed by: NURSE PRACTITIONER

## 2022-03-09 PROCEDURE — 73221 MRI JOINT UPR EXTREM W/O DYE: CPT

## 2022-03-09 RX ORDER — AZITHROMYCIN 250 MG/1
TABLET, FILM COATED ORAL
COMMUNITY
Start: 2021-10-08 | End: 2022-03-10

## 2022-03-09 NOTE — PROGRESS NOTES
Chief Complaint   Patient presents with    Elbow Pain     Left elbow bicep pain since 3/6/22. Patient was helping to move a car and when he pulled he felt a pop in the bicep area. Eric Childers  @[facility]@  presents today for evaluation of his left elbow pain. The patient states the pain started  3 days ago. The pain is sharp in nature. It is worse with movement and better with rest.  The patient does not complain of night pain. The patient is right hand dominant. 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       Past Medical History:   Diagnosis Date    Sleep apnea     never had sleep study     Past Surgical History:   Procedure Laterality Date    KNEE ARTHROSCOPY      SHOULDER ARTHROSCOPY      biceps tendon    SHOULDER FRACTURE SURGERY Left 2020    OPEN REDUCTION INTERNAL FIXATION OF A LEFT CLAVICLE (ACCUMED) performed by Maria Guadalupe Muhammad MD at [address]       Current Outpatient Medications:     azithromycin (ZITHROMAX) 250 MG tablet, , Disp: , Rfl:     sertraline (ZOLOFT) 50 MG tablet, Take 50 mg by mouth daily hasnt taken for 1 month, Disp: , Rfl:   Allergies   Allergen Reactions    Soybean-Containing Drug Products      Social History     Socioeconomic History    Marital status:      Spouse name: Not on file    Number of children: Not on file    Years of education: Not on file    Highest education level: Not on file   Occupational History    Not on file   Tobacco Use    Smoking status: Former Smoker     Packs/day: 1.00     Years: 2.00     Pack years: 2.00     Types: Cigarettes     Start date: 3/25/2000     Quit date: 3/25/2002     Years since quittin.9    Smokeless tobacco: Current User     Types: Snuff   Vaping Use    Vaping Use: Never used   Substance and Sexual Activity    Alcohol use: Yes     Comment: social     Drug use: Never    Sexual activity: Yes     Partners: Female   Other Topics Concern    Not on file   Social History Narrative    Not on file     Social Determinants of Health     Financial Resource Strain:     Difficulty of Paying Living Expenses: Not on file   Food Insecurity:     Worried About Running Out of Food in the Last Year: Not on file    Carlos of Food in the Last Year: Not on file   Transportation Needs:     Lack of Transportation (Medical): Not on file    Lack of Transportation (Non-Medical): Not on file   Physical Activity:     Days of Exercise per Week: Not on file    Minutes of Exercise per Session: Not on file   Stress:     Feeling of Stress : Not on file   Social Connections:     Frequency of Communication with Friends and Family: Not on file    Frequency of Social Gatherings with Friends and Family: Not on file    Attends Mormon Services: Not on file    Active Member of [address] [place] [address] [employer] or Organizations: Not on file    Attends Club or Organization Meetings: Not on file    Marital Status: Not on file   Intimate Partner Violence:     Fear of Current or Ex-Partner: Not on file    Emotionally Abused: Not on file    Physically Abused: Not on file    Sexually Abused: Not on file   Housing Stability:     Unable to Pay for Housing in the Last Year: Not on file    Number of [place] in the Last Year: Not on file    Unstable Housing in the Last Year: Not on file     No family history on file. REVIEW OF SYSTEMS:     General/Constitution:  (-)weight loss, (-)fever, (-)chills, (-)weakness. Skin: (-) rash,(-) psoriasis,(-) eczema, (-)skin cancer. Musculoskeletal: (-) fractures,  (-) dislocations,(-) collagen vascular disease, (-) fibromyalgia, (-) multiple sclerosis, (-) muscular dystrophy, (-) RSD,(-) joint pain (-)swelling, (-) joint pain,swelling. Neurologic: (-) epilepsy, (-)seizures,(-) brain tumor,(-) TIA, (-)stroke, (-)headaches, (-)Parkinson disease,(-) memory loss, (-) LOC.   Cardiovascular: (-) Chest pain, (-) swelling in legs/feet, (-) SOB, (-) cramping in legs/feet with walking. Respiratory: (-) SOB, (-) Coughing, (-) night sweats. GI: (-) nausea, (-) vomiting, (-) diarrhea, (-) blood in stool, (-) gastric ulcer. Psychiatric: (-) Depression, (-) Anxiety, (-) bipolar disease, (-) Alzheimer's Disease  Allergic/Immunologic: (-) allergies latex, (-) allergies metal, (-) skin sensitivity. Hematlogic: (-) anemia, (-) blood transfusion, (-) DVT/PE, (-) Clotting disorders       Subjective:      Constitution:    Temp 98 °F (36.7 °C)   Ht 6' 2\" (1.88 m)   Wt 266 lb (120.7 kg)   BMI 34.15 kg/m²     Psycihatric:    The patient is alert and oriented x 3, appears to be stated age and in no distress. Respiratory:    Respiratory effort is not labored. Patient is not gasping. Palpation of the chest reveals no tactile fremitus. Skin:    Upon inspection: the skin appears warm, dry and intact. There is not a previous scar over the affected area. There is not any cellulitis, lymphedema or cutaneous lesions noted in the lower extremities. Upon palpation there is no induration noted. Neurologic:      Motor exam of the upper extremities show: The reflexes in biceps/triceps/brachioradialis are equal and symmetric. Sensory exam C5-T1 are normal bilaterally. Cardiovascular: The vascular exam is normal and is well perfused to distal extremities. There are 2+ radial pulses bilaterally, and motor and sensation is intact to median, ulnar, and radial, musclocutaneus, and axillary nerve distribution and grossly symmetric bilaterally. There is cap refill noted less than two seconds in all digits. There is not edema of the bilateral upper extremities. There is not varicosities noted in the distal extremities. Lymph:    Upon palpation,  there is no lymphadenopathy noted in bilateral upper extremities. Musculoskeletal:    Gait: normal; examination of the nails and digits reveal no cyanosis or clubbing.       Cervical Exam:    On physical exam, [facility] is well-developed, well-nourished, oriented to person, place and time. his gait is normal.  On evaluation of his cervical spine, he has full range of motion of the cervical spine without pain. There is no cervical tenderness to palpation. Shoulder Exam:     On evaluation of his bilaterally upper extremities, his bilateral shoulder has no deformity. There is not evidence of scapular dyskinesis. There is not muscle atrophy in shoulder girdle. The range of motion for the Right Shoulder is 150/50/t8 and for the Left shoulder is 150/50/t8. Right shoulder Motor strength is 5/5 in the supraspinatus, 5/5 internal rotation and 5/5 in external rotation, and Left shoulder motor strength 5/5 in supraspinatus, 5/5 in internal rotation, 5/5 in external rotation. Right shoulder:  (-) Impingement , (-) Sanchez , (-) Speeds, (-) Apprehension ,(-) Cloud Load Shift, (-) [employer], (-) Cross arm test.     Left shoulder:  (-) Impingement, (-) Sanchez, (-) Speeds, (-) Apprehension,(-) Cloud, (-) Load Shift, (-) [employer],(-)  Cross arm test        Left  elbow: pain is located antecubital space and forearm up to bicep muscle belly. Range of motion: R.Elbow flexion 140/extension 0; L. Elbow flexion 120/extension 5. ; Wrist ROM R wrist DF 90, VF 90, L wrist DF 90, VF 90, R pronation 90/ supination 90, L pronation 90/supination 90 with pain and 4/5 weakness. There is swelling, there is ecchymosis to bicep muscle belly. Exam is compared bilaterally. Right:  Special tests: Cozen's sign negative. Reverse cozen sign negative    Left:  Special tests: Cozen's sign negative. Reverse cozen sign negative    Xray:  No acute abnormalities    MRI:  n/a    Radiographic findings reviewed with patient    Assessment:   Encounter Diagnosis   Name Primary?  Rupture of left distal biceps tendon, initial encounter Yes       Plan:    Natural history and expected course discussed. Questions answered.   Rest, ice,

## 2022-03-10 ENCOUNTER — OFFICE VISIT (OUTPATIENT)
Dept: ORTHOPEDIC SURGERY | Age: 47
End: 2022-03-10
Payer: COMMERCIAL

## 2022-03-10 VITALS — WEIGHT: 266 LBS | HEIGHT: 74 IN | BODY MASS INDEX: 34.14 KG/M2

## 2022-03-10 DIAGNOSIS — S46.212A STRAIN OF LEFT BICEPS, INITIAL ENCOUNTER: Primary | ICD-10-CM

## 2022-03-10 PROCEDURE — 99214 OFFICE O/P EST MOD 30 MIN: CPT | Performed by: ORTHOPAEDIC SURGERY

## 2022-03-10 RX ORDER — CELECOXIB 200 MG/1
200 CAPSULE ORAL DAILY
Qty: 30 CAPSULE | Refills: 3 | Status: SHIPPED | OUTPATIENT
Start: 2022-03-10 | End: 2022-07-08

## 2022-03-10 NOTE — PROGRESS NOTES
Chief Complaint   Patient presents with    Elbow Pain     Left elbow MRI follow up       [address] @male@  presents today for evaluation of his left elbow pain. The patient states the pain started  3 days ago. The pain is sharp in nature. It is worse with movement and better with rest.  The patient does not complain of night pain. The patient is right hand dominant. 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 Past Medical History:   Diagnosis Date    Sleep apnea     never had sleep study     Past Surgical History:   Procedure Laterality Date    KNEE ARTHROSCOPY      SHOULDER ARTHROSCOPY      biceps tendon    SHOULDER FRACTURE SURGERY Left 2020    OPEN REDUCTION INTERNAL FIXATION OF A LEFT CLAVICLE (ACCUMED) performed by Jean Hatch MD at [address]     No current outpatient medications on file.   Allergies   Allergen Reactions    Soybean-Containing Drug Products      Social History     Socioeconomic History    Marital status:      Spouse name: Not on file    Number of children: Not on file    Years of education: Not on file    Highest education level: Not on file   Occupational History    Not on file   Tobacco Use    Smoking status: Former Smoker     Packs/day: 1.00     Years: 2.00     Pack years: 2.00     Types: Cigarettes     Start date: 3/25/2000     Quit date: 3/25/2002     Years since quittin.9    Smokeless tobacco: Current User     Types: Snuff   Vaping Use    Vaping Use: Never used   Substance and Sexual Activity    Alcohol use: Yes     Comment: social     Drug use: Never    Sexual activity: Yes     Partners: Female   Other Topics Concern    Not on file   Social History Narrative    Not on file     Social Determinants of Health     Financial Resource Strain:     Difficulty of Paying Living Expenses: Not on file   Food Insecurity:     Worried About [address] [employer] in the Last Year: Not on file    [address] in the Last Year: Not on file   Transportation Needs:     Lack of Transportation (Medical): Not on file    Lack of Transportation (Non-Medical): Not on file   Physical Activity:     Days of Exercise per Week: Not on file    Minutes of Exercise per Session: Not on file   Stress:     Feeling of Stress : Not on file   Social Connections:     Frequency of Communication with Friends and Family: Not on file    Frequency of Social Gatherings with Friends and Family: Not on file    Attends Yazdanism Services: Not on file    Active Member of [address] [place] [address] [employer] or Organizations: Not on file    Attends Club or Organization Meetings: Not on file    Marital Status: Not on file   Intimate Partner Violence:     Fear of Current or Ex-Partner: Not on file    Emotionally Abused: Not on file    Physically Abused: Not on file    Sexually Abused: Not on file   Housing Stability:     Unable to Pay for Housing in the Last Year: Not on file    Number of [place] in the Last Year: Not on file    Unstable Housing in the Last Year: Not on file     No family history on file. REVIEW OF SYSTEMS:     General/Constitution:  (-)weight loss, (-)fever, (-)chills, (-)weakness. Skin: (-) rash,(-) psoriasis,(-) eczema, (-)skin cancer. Musculoskeletal: (-) fractures,  (-) dislocations,(-) collagen vascular disease, (-) fibromyalgia, (-) multiple sclerosis, (-) muscular dystrophy, (-) RSD,(-) joint pain (-)swelling, (-) joint pain,swelling. Neurologic: (-) epilepsy, (-)seizures,(-) brain tumor,(-) TIA, (-)stroke, (-)headaches, (-)Parkinson disease,(-) memory loss, (-) LOC. Cardiovascular: (-) Chest pain, (-) swelling in legs/feet, (-) SOB, (-) cramping in legs/feet with walking. Respiratory: (-) SOB, (-) Coughing, (-) night sweats. GI: (-) nausea, (-) vomiting, (-) diarrhea, (-) blood in stool, (-) gastric ulcer.   Psychiatric: (-) Depression, (-) Anxiety, (-) bipolar disease, (-) Alzheimer's Disease  Allergic/Immunologic: (-) allergies latex, (-) allergies metal, (-) skin sensitivity. Hematlogic: (-) anemia, (-) blood transfusion, (-) DVT/PE, (-) Clotting disorders       Subjective:      Constitution:    Ht 6' 2\" (1.88 m)   Wt 266 lb (120.7 kg)   BMI 34.15 kg/m²     Psycihatric:    The patient is alert and oriented x 3, appears to be stated age and in no distress. Respiratory:    Respiratory effort is not labored. Patient is not gasping. Palpation of the chest reveals no tactile fremitus. Skin:    Upon inspection: the skin appears warm, dry and intact. There is not a previous scar over the affected area. There is not any cellulitis, lymphedema or cutaneous lesions noted in the lower extremities. Upon palpation there is no induration noted. Neurologic:      Motor exam of the upper extremities show: The reflexes in biceps/triceps/brachioradialis are equal and symmetric. Sensory exam C5-T1 are normal bilaterally. Cardiovascular: The vascular exam is normal and is well perfused to distal extremities. There are 2+ radial pulses bilaterally, and motor and sensation is intact to median, ulnar, and radial, musclocutaneus, and axillary nerve distribution and grossly symmetric bilaterally. There is cap refill noted less than two seconds in all digits. There is not edema of the bilateral upper extremities. There is not varicosities noted in the distal extremities. Lymph:    Upon palpation,  there is no lymphadenopathy noted in bilateral upper extremities. Musculoskeletal:    Gait: normal; examination of the nails and digits reveal no cyanosis or clubbing. Cervical Exam:    On physical exam, Fidel Singh is well-developed, well-nourished, oriented to person, place and time. his gait is normal.  On evaluation of his cervical spine, he has full range of motion of the cervical spine without pain. There is no cervical tenderness to palpation.      Shoulder Exam:     On evaluation of his bilaterally upper extremities, his bilateral shoulder has no deformity. There is not evidence of scapular dyskinesis. There is not muscle atrophy in shoulder girdle. The range of motion for the Right Shoulder is 150/50/t8 and for the Left shoulder is 150/50/t8. Right shoulder Motor strength is 5/5 in the supraspinatus, 5/5 internal rotation and 5/5 in external rotation, and Left shoulder motor strength 5/5 in supraspinatus, 5/5 in internal rotation, 5/5 in external rotation. Right shoulder:  (-) Impingement , (-) Sanchez , (-) Speeds, (-) Apprehension ,(-) Cloud Load Shift, (-) [employer], (-) Cross arm test.     Left shoulder:  (-) Impingement, (-) Sanchez, (-) Speeds, (-) Apprehension,(-) Cloud, (-) Load Shift, (-) [employer],(-)  Cross arm test        Left  elbow: pain is located antecubital space and forearm up to bicep muscle belly. Range of motion: R.Elbow flexion 140/extension 0; L. Elbow flexion 120/extension 5. ; Wrist ROM R wrist DF 90, VF 90, L wrist DF 90, VF 90, R pronation 90/ supination 90, L pronation 90/supination 90 with pain and 4/5 weakness. There is swelling, there is ecchymosis to bicep muscle belly. Exam is compared bilaterally. Right:  Special tests: Cozen's sign negative. Reverse cozen sign negative    Left:  Special tests: Cozen's sign negative. Reverse cozen sign negative    Xray:  No acute abnormalities    MRI:   Impression   1. Intermediate grade partial-thickness tearing of the distal biceps tendon. No complete rupture or retraction.  Soft tissue edema surrounds the tendon   with a small amount of fluid in the bicipitoradial bursa. 2. Chronic partial-thickness tearing involving the common extensor tendon   origin and lateral collateral ligament complex. Radiographic findings reviewed with patient    Assessment:   Encounter Diagnosis   Name Primary?  Strain of left biceps, initial encounter Yes       Plan:    Natural history and expected course discussed. Questions answered. Rest, ice, compression, and elevation (RICE) therapy. Reduction in offending activity. At least 30 minutes was spent discussing the diagnosis and treatment options with the patient with at least 50% of the time was spent with decision making and counseling the patient. I had a lengthy discussion with the patient regarding their diagnosis. I explained treatment options including surgical vs non surgical treatment. I reviewed in detail the risks and benefits and outlined the procedure in detail with expected outcomes and possible complications. I also discussed non surgical treatment such as injections (CSI and visco supplementation), physical therapy, topical creams and NSAID's. They have elected for conservative management at this time.    Rest for 6 wks  celebrex  F/u 6 wks

## 2022-12-29 ENCOUNTER — NURSE TRIAGE (OUTPATIENT)
Dept: OTHER | Facility: CLINIC | Age: 47
End: 2022-12-29

## 2022-12-29 ENCOUNTER — TELEPHONE (OUTPATIENT)
Dept: FAMILY MEDICINE CLINIC | Age: 47
End: 2022-12-29

## 2022-12-29 RX ORDER — METOPROLOL SUCCINATE 50 MG/1
TABLET, EXTENDED RELEASE ORAL
COMMUNITY
Start: 2022-12-16

## 2022-12-29 RX ORDER — ALPRAZOLAM 0.5 MG/1
TABLET ORAL
COMMUNITY
Start: 2022-12-16

## 2022-12-29 RX ORDER — FLUOXETINE 10 MG/1
CAPSULE ORAL
COMMUNITY
Start: 2022-12-16

## 2022-12-29 NOTE — TELEPHONE ENCOUNTER
Location of patient: [address] call from [place] at [facility]; Patient with [employer] Complaint requesting to establish care with Dr Isai Agosto. Subjective: Caller states \"I was on Sertraline years ago, up to 800 mg a day, it was to help me mellow out. It had too many side effects. My dad pushing 80 yrs old and he had health issues. Back in Aprill , I started taking  Sertraline 100 mg- 1/2 pill and about day 4 I began having panic attacks. My blood pressure has been okay mostly, but I am out of breath and just don't feel right. I have been prescribed xanax, prozac, and a beta blocker. The side effects were unacceptable, so I stopped taking the prozac. I take a xanax with me to work just in case. \"     Current Symptoms: depression, anxiety, panic attack. Panic attacks( doesn't just last a few minutes and done, but will last,for hours,  restless at night    Did have suicidal thoughts while taking Prozac. Onset: April 2022  worsening    Associated Symptoms: increased wakefulness    Pain Severity: 0/10; N/A; none    Temperature: n/a     What has been tried: Sertraline 20-50 mg q day    LMP: NA Pregnant: NA    Recommended disposition: See in Office Today or Tomorrow    Walk in clinic if no appts    Care advice provided, patient verbalizes understanding; denies any other questions or concerns; instructed to call back for any new or worsening symptoms. Patient/Caller agrees with recommended disposition; writer provided warm transfer to [place] at [facility] for appointment scheduling    Attention Provider: Thank you for allowing me to participate in the care of your patient. The patient was connected to triage in response to information provided to the [facility]. Please do not respond through this encounter as the response is not directed to a shared pool. Reason for Disposition   Symptoms interfere with work or school    Protocols used:  Anxiety and Panic Attack-ADULT-[place]

## 2022-12-29 NOTE — TELEPHONE ENCOUNTER
Received nurse triage call re: panic attack. Wife states patient is an approved new patient of Dr. Nellie Mary however, no documentation is in the chart. Patient encouraged x 2 to visit [facility] or [facility] walk in clinic or urgent care as wife states patient is concerned for his physical well being, also stating Celyruy Luna is not suicidal or anything\".  Wife states they will wait for response from Dr. Nellie Mary re: new patient approval.

## 2023-02-09 ENCOUNTER — OFFICE VISIT (OUTPATIENT)
Dept: FAMILY MEDICINE CLINIC | Age: 48
End: 2023-02-09
Payer: COMMERCIAL

## 2023-02-09 VITALS
HEART RATE: 76 BPM | BODY MASS INDEX: 32.73 KG/M2 | WEIGHT: 255 LBS | DIASTOLIC BLOOD PRESSURE: 72 MMHG | HEIGHT: 74 IN | RESPIRATION RATE: 20 BRPM | SYSTOLIC BLOOD PRESSURE: 124 MMHG | OXYGEN SATURATION: 97 %

## 2023-02-09 DIAGNOSIS — F41.9 ANXIETY: Primary | ICD-10-CM

## 2023-02-09 DIAGNOSIS — Z13.220 SCREENING CHOLESTEROL LEVEL: ICD-10-CM

## 2023-02-09 PROCEDURE — 99204 OFFICE O/P NEW MOD 45 MIN: CPT | Performed by: FAMILY MEDICINE

## 2023-02-09 RX ORDER — VENLAFAXINE HYDROCHLORIDE 75 MG/1
75 CAPSULE, EXTENDED RELEASE ORAL DAILY
Qty: 30 CAPSULE | Refills: 3 | Status: SHIPPED | OUTPATIENT
Start: 2023-02-09

## 2023-02-09 SDOH — ECONOMIC STABILITY: FOOD INSECURITY: WITHIN THE PAST 12 MONTHS, YOU WORRIED THAT YOUR FOOD WOULD RUN OUT BEFORE YOU GOT MONEY TO BUY MORE.: PATIENT DECLINED

## 2023-02-09 SDOH — ECONOMIC STABILITY: INCOME INSECURITY: HOW HARD IS IT FOR YOU TO PAY FOR THE VERY BASICS LIKE FOOD, HOUSING, MEDICAL CARE, AND HEATING?: PATIENT DECLINED

## 2023-02-09 SDOH — ECONOMIC STABILITY: FOOD INSECURITY: WITHIN THE PAST 12 MONTHS, THE FOOD YOU BOUGHT JUST DIDN'T LAST AND YOU DIDN'T HAVE MONEY TO GET MORE.: PATIENT DECLINED

## 2023-02-09 SDOH — ECONOMIC STABILITY: HOUSING INSECURITY
IN THE LAST 12 MONTHS, WAS THERE A TIME WHEN YOU DID NOT HAVE A STEADY PLACE TO SLEEP OR SLEPT IN A SHELTER (INCLUDING NOW)?: PATIENT REFUSED

## 2023-02-09 ASSESSMENT — PATIENT HEALTH QUESTIONNAIRE - PHQ9
SUM OF ALL RESPONSES TO PHQ QUESTIONS 1-9: 0
SUM OF ALL RESPONSES TO PHQ QUESTIONS 1-9: 0
1. LITTLE INTEREST OR PLEASURE IN DOING THINGS: 0
SUM OF ALL RESPONSES TO PHQ QUESTIONS 1-9: 0
2. FEELING DOWN, DEPRESSED OR HOPELESS: 0
SUM OF ALL RESPONSES TO PHQ9 QUESTIONS 1 & 2: 0
SUM OF ALL RESPONSES TO PHQ QUESTIONS 1-9: 0

## 2023-02-09 ASSESSMENT — ENCOUNTER SYMPTOMS: SHORTNESS OF BREATH: 0

## 2023-02-09 NOTE — PROGRESS NOTES
Patient is a new patient here to get established. Previous doctor: Dr. Homa Brown Trini(:  1975) is a 50 y.o. male, here for     Anxiety  Presents for initial visit. Onset was 6 to 12 months ago. The problem has been waxing and waning. Symptoms include chest pain (tightness), depressed mood, dizziness, excessive worry, nervous/anxious behavior, palpitations, panic and suicidal ideas. Patient reports no decreased concentration, insomnia, irritability or shortness of breath. Symptoms occur most days. The severity of symptoms is severe. The symptoms are aggravated by family issues (grief--started after father  last year). The patient sleeps 6 hours per night. The quality of sleep is fair. Nighttime awakenings: occasional.     Past treatments include benzodiazephines and SSRIs (Prozac). Compliance with prior treatments has been poor (stopped taking Prozac after 8 days). Review of Systems   Constitutional:  Negative for irritability. Respiratory:  Negative for shortness of breath. Cardiovascular:  Positive for chest pain (tightness) and palpitations. Neurological:  Positive for dizziness. Psychiatric/Behavioral:  Positive for sleep disturbance and suicidal ideas. Negative for decreased concentration. The patient is nervous/anxious. The patient does not have insomnia. Prior to Visit Medications    Medication Sig Taking?  Authorizing Provider   venlafaxine (EFFEXOR XR) 75 MG extended release capsule Take 1 capsule by mouth daily Yes Austin Terry MD   ALPRAZolam Ngihat Loosen) 0.5 MG tablet take 1 tablet by mouth three times a day if needed Yes Historical Provider, MD   celecoxib (CELEBREX) 200 MG capsule Take 1 capsule by mouth daily  Qamar Drake DO        Allergies   Allergen Reactions    Soybean-Containing Drug Products        Past Medical History:   Diagnosis Date    Sleep apnea     never had sleep study       Past Surgical History:   Procedure Laterality Date    KNEE ARTHROSCOPY      SHOULDER ARTHROSCOPY      biceps tendon    SHOULDER FRACTURE SURGERY Left 2020    OPEN REDUCTION INTERNAL FIXATION OF A LEFT CLAVICLE (ACCUMED) performed by Caryn Smith MD at [address] History     Socioeconomic History    Marital status:      Spouse name: Not on file    Number of children: Not on file    Years of education: Not on file    Highest education level: Not on file   Occupational History    Not on file   Tobacco Use    Smoking status: Former     Packs/day: 1.00     Years: 2.00     Pack years: 2.00     Types: Cigarettes     Start date: 3/25/2000     Quit date: 3/25/2002     Years since quittin.8    Smokeless tobacco: Current     Types: Snuff   Vaping Use    Vaping Use: Never used   Substance and Sexual Activity    Alcohol use: Yes     Comment: social     Drug use: Never    Sexual activity: Yes     Partners: Female   Other Topics Concern    Not on file   Social History Narrative    Not on file     Social Determinants of Health     Financial Resource Strain: Unknown    Difficulty of Paying Living Expenses: Patient refused   Food Insecurity: Unknown    Worried About [address] in the Last Year: Patient refused    [address] in the Last Year: Patient refused   Transportation Needs: Unknown    Lack of Transportation (Medical):  Not on file    Lack of Transportation (Non-Medical): Patient refused   Physical Activity: Not on file   Stress: Not on file   Social Connections: Not on file   Intimate Partner Violence: Not on file   Housing Stability: Unknown    Unable to Pay for Housing in the Last Year: Not on file    Number of [place] in the Last Year: Not on file    Unstable Housing in the Last Year: Patient refused        Family History   Problem Relation Age of Onset    Diabetes Mother     Heart Disease Father        Vitals:    23 1512   BP: 124/72   Pulse: 76   Resp: 20   SpO2: 97%   Weight: 255 lb (115.7 kg)   Height: 6' 2\" (1.88 m) Estimated body mass index is 32.74 kg/m² as calculated from the following:    Height as of this encounter: 6' 2\" (1.88 m). Weight as of this encounter: 255 lb (115.7 kg). Physical Exam  Vitals reviewed. Constitutional:       Appearance: He is well-developed. Cardiovascular:      Rate and Rhythm: Normal rate and regular rhythm. Heart sounds: Normal heart sounds. No murmur heard. No friction rub. Pulmonary:      Effort: Pulmonary effort is normal. No respiratory distress. Breath sounds: Normal breath sounds. No wheezing or rales. Abdominal:      General: Bowel sounds are normal. There is no distension. Palpations: Abdomen is soft. Tenderness: There is no abdominal tenderness. There is no guarding or rebound. Skin:     General: Skin is warm and dry. Neurological:      Mental Status: He is alert. ASSESSMENT/PLAN:  Frandy August was seen today for established new doctor and anxiety. Diagnoses and all orders for this visit:    Anxiety  -     start venlafaxine (EFFEXOR XR) 75 MG extended release capsule; Take 1 capsule by mouth daily  -     CBC; Future  -     Comprehensive Metabolic Panel; Future  -     TSH; Future    Screening cholesterol level  -     Lipid Panel; Future        Return in about 1 month (around 3/9/2023) for anxiety.     Huma Walters MD

## 2023-03-09 ENCOUNTER — OFFICE VISIT (OUTPATIENT)
Dept: FAMILY MEDICINE CLINIC | Age: 48
End: 2023-03-09
Payer: COMMERCIAL

## 2023-03-09 VITALS
SYSTOLIC BLOOD PRESSURE: 122 MMHG | WEIGHT: 246 LBS | HEART RATE: 62 BPM | HEIGHT: 73 IN | BODY MASS INDEX: 32.6 KG/M2 | OXYGEN SATURATION: 97 % | RESPIRATION RATE: 20 BRPM | DIASTOLIC BLOOD PRESSURE: 74 MMHG

## 2023-03-09 DIAGNOSIS — F41.1 GENERALIZED ANXIETY DISORDER: Primary | ICD-10-CM

## 2023-03-09 PROCEDURE — 99214 OFFICE O/P EST MOD 30 MIN: CPT | Performed by: FAMILY MEDICINE

## 2023-03-09 ASSESSMENT — ENCOUNTER SYMPTOMS
VOMITING: 0
SHORTNESS OF BREATH: 0
DIARRHEA: 0
NAUSEA: 0

## 2023-03-09 NOTE — PROGRESS NOTES
Chief Complaint   Patient presents with    Anxiety     Medication works needs  xanax  once in awhile        Anxiety and depression:  Patient is here with complaints of anxiety and/or depression. This is a/an recent problem. This has been going on for several months. Exacerbating factors include general stressors. Alleviating factors include Effexor XR 75--feels 75% better but has noticed slightly decreased appetite. Patient does not have suicidal or homicidal ideation. Patient is not having issues falling or staying asleep. Patient is rarely having associated panic attacks. The above is not interfering with quality of life. Recent lab results reviewed with patient, including CMP, CBC, and TSH  which are not remarkable. Patient's past medical, surgical, social and/or family history reviewed, updated in chart, and arenon-contributory (unless otherwise stated). Medications and allergies also reviewed and updated in chart. /74   Pulse 62   Resp 20   Ht 6' 1\" (1.854 m)   Wt 246 lb (111.6 kg)   SpO2 97%   BMI 32.46 kg/m²   Wt Readings from Last 3 Encounters:   03/09/23 246 lb (111.6 kg)   02/09/23 255 lb (115.7 kg)   03/10/22 266 lb (120.7 kg)       Review of Systems   Eyes:  Positive for visual disturbance (seems out of focus at times, but seems to be clearing up). Respiratory:  Negative for shortness of breath. Cardiovascular:  Negative for chest pain, palpitations and leg swelling. Gastrointestinal:  Negative for diarrhea, nausea and vomiting. Genitourinary:  Negative for difficulty urinating, dysuria and frequency. Skin:  Negative for rash. Psychiatric/Behavioral:  Negative for dysphoric mood.       Current Outpatient Medications   Medication Sig Dispense Refill    venlafaxine (EFFEXOR XR) 75 MG extended release capsule Take 1 capsule by mouth daily 30 capsule 3    ALPRAZolam (XANAX) 0.5 MG tablet take 1 tablet by mouth three times a day if needed 90 tablet 0    celecoxib (CELEBREX) 200 MG capsule Take 1 capsule by mouth daily 30 capsule 3     No current facility-administered medications for this visit.         Physical Exam  Vitals reviewed.   Constitutional:       Appearance: He is well-developed.   Cardiovascular:      Rate and Rhythm: Normal rate and regular rhythm.      Heart sounds: Normal heart sounds. No murmur heard.    No friction rub.   Pulmonary:      Effort: Pulmonary effort is normal. No respiratory distress.      Breath sounds: Normal breath sounds. No wheezing or rales.   Abdominal:      General: Bowel sounds are normal. There is no distension.      Palpations: Abdomen is soft.      Tenderness: There is no abdominal tenderness. There is no guarding or rebound.   Skin:     General: Skin is warm and dry.   Neurological:      Mental Status: He is alert.   Psychiatric:         Attention and Perception: Attention and perception normal.         Mood and Affect: Mood and affect normal.             Ananth was seen today for anxiety.    Diagnoses and all orders for this visit:    Generalized anxiety disorder  -     venlafaxine (EFFEXOR XR) 75 MG extended release capsule; Take 1 capsule by mouth daily  -     ALPRAZolam (XANAX) 0.5 MG tablet; take 1 tablet by mouth three times a day if needed      Return in about 3 months (around 6/9/2023) for anxiety., or sooner if necessary.          Educational materials and/or home exercises printed for patient's review and were included in patient instructions onhis/her After Visit Summary and given to patient at the end of visit.      Counseled regarding above diagnosis, including possible risks andcomplications,  especially if left uncontrolled.    Counseled regarding the possible side effects, risks, benefits and alternatives to treatment; patient and/or guardian verbalizes understanding, agrees, feelscomfortable with and wishes to proceed with above treatment plan.    Advised patient to call with any new medication issues, and read all Rx  info from pharmacy to assure aware of all possible risks and side effects ofmedication before taking. Reviewed age and gender appropriate health screening exams and vaccinations. Advised patient regarding importance of keeping up with recommended health maintenance and to schedule as soonas possible if overdue, as this is important in assessing for undiagnosed pathology, especially cancer, as well as protecting against potentially harmful/life threatening disease. Patient and/or guardianverbalizes understanding and agrees with above counseling, assessment and plan. All questions answered.

## 2023-03-10 PROBLEM — S42.022A CLOSED DISPLACED FRACTURE OF SHAFT OF LEFT CLAVICLE: Status: RESOLVED | Noted: 2020-03-25 | Resolved: 2023-03-10

## 2023-03-10 PROBLEM — S22.42XD CLOSED FRACTURE OF MULTIPLE RIBS OF LEFT SIDE WITH ROUTINE HEALING: Status: RESOLVED | Noted: 2020-03-25 | Resolved: 2023-03-10

## 2023-03-10 PROBLEM — F41.1 GENERALIZED ANXIETY DISORDER: Status: ACTIVE | Noted: 2023-03-10

## 2023-03-10 PROBLEM — S42.302G: Status: RESOLVED | Noted: 2020-03-25 | Resolved: 2023-03-10

## 2023-03-10 PROBLEM — S22.49XG: Status: RESOLVED | Noted: 2020-03-25 | Resolved: 2023-03-10

## 2023-03-10 PROBLEM — S22.41XD MULTIPLE FRACTURES OF RIBS OF RIGHT SIDE WITH ROUTINE HEALING: Status: RESOLVED | Noted: 2020-03-25 | Resolved: 2023-03-10

## 2023-03-10 RX ORDER — ALPRAZOLAM 0.5 MG/1
TABLET ORAL
Qty: 90 TABLET | Refills: 0
Start: 2023-03-10 | End: 2024-03-10

## 2023-03-10 RX ORDER — VENLAFAXINE HYDROCHLORIDE 75 MG/1
75 CAPSULE, EXTENDED RELEASE ORAL DAILY
Qty: 30 CAPSULE | Refills: 3
Start: 2023-03-10

## 2023-04-11 ENCOUNTER — PATIENT MESSAGE (OUTPATIENT)
Dept: FAMILY MEDICINE CLINIC | Age: 48
End: 2023-04-11

## 2023-04-17 NOTE — TELEPHONE ENCOUNTER
From: Yoly Ya  To: Dr. Karena Langford  Sent: 4/11/2023 9:50 AM EDT  Subject: Medicine    Good morning. ..hope you are well. Is there something to take to help with reaching the finish line in intimate moments with my wife. .. Struggling with this. .. Look forward to hearing from you. .  Thank you

## 2023-04-19 RX ORDER — SILDENAFIL CITRATE 20 MG/1
TABLET ORAL
Qty: 30 TABLET | Refills: 2 | Status: SHIPPED | OUTPATIENT
Start: 2023-04-19

## 2023-07-13 ENCOUNTER — OFFICE VISIT (OUTPATIENT)
Dept: FAMILY MEDICINE CLINIC | Age: 48
End: 2023-07-13
Payer: COMMERCIAL

## 2023-07-13 VITALS
SYSTOLIC BLOOD PRESSURE: 124 MMHG | HEIGHT: 71 IN | OXYGEN SATURATION: 98 % | DIASTOLIC BLOOD PRESSURE: 80 MMHG | HEART RATE: 62 BPM | BODY MASS INDEX: 33.88 KG/M2 | WEIGHT: 242 LBS | RESPIRATION RATE: 20 BRPM

## 2023-07-13 DIAGNOSIS — F41.1 GENERALIZED ANXIETY DISORDER: ICD-10-CM

## 2023-07-13 PROCEDURE — 99214 OFFICE O/P EST MOD 30 MIN: CPT | Performed by: FAMILY MEDICINE

## 2023-07-13 RX ORDER — ALPRAZOLAM 0.5 MG/1
TABLET ORAL
Qty: 90 TABLET | Refills: 0 | Status: SHIPPED
Start: 2023-07-13 | End: 2024-07-13

## 2023-07-13 RX ORDER — VENLAFAXINE HYDROCHLORIDE 150 MG/1
150 CAPSULE, EXTENDED RELEASE ORAL DAILY
Qty: 90 CAPSULE | Refills: 1 | Status: SHIPPED | OUTPATIENT
Start: 2023-07-13

## 2023-07-13 ASSESSMENT — ENCOUNTER SYMPTOMS
DIARRHEA: 1
NAUSEA: 0
SHORTNESS OF BREATH: 0
VOMITING: 0

## 2023-07-25 ENCOUNTER — PATIENT MESSAGE (OUTPATIENT)
Dept: FAMILY MEDICINE CLINIC | Age: 48
End: 2023-07-25

## 2023-07-26 RX ORDER — SILDENAFIL CITRATE 20 MG/1
TABLET ORAL
Qty: 30 TABLET | Refills: 2 | Status: SHIPPED | OUTPATIENT
Start: 2023-07-26

## 2023-07-26 NOTE — TELEPHONE ENCOUNTER
From: Alyse Schirmer  To: Dr. Kassandra Kaiser  Sent: 7/25/2023 1:50 PM EDT  Subject: Generic Viagra    Can you refill the perscription for the generic viagra. Fredy Number Fredy Number Meli taken some. ..and had great results. Fredy Number Fredy Number I think you cancelled last visit. Fredy Number thank you

## 2023-08-16 ENCOUNTER — PATIENT MESSAGE (OUTPATIENT)
Dept: FAMILY MEDICINE CLINIC | Age: 48
End: 2023-08-16

## 2023-08-16 DIAGNOSIS — F41.1 GENERALIZED ANXIETY DISORDER: ICD-10-CM

## 2023-08-22 NOTE — TELEPHONE ENCOUNTER
From: Melba Juárez  Sent: 8/22/2023 10:25 AM EDT  To: Paul Blackburn Clinical Staff  Subject: VENAFLAXINE DOSE    Good morning. ..having side effects w temper on this Wellbrutin. Rubio Sen on top of my mother passed Sunday a.m. Is it ok to just go back to 75mg of the Venaflaxine. .. I felt much better on those. Rubio Sen w sexual side effects getting better until we went 150mg. .. Please let me your thoughts asap. Rubio Sen

## 2023-08-23 RX ORDER — VENLAFAXINE HYDROCHLORIDE 75 MG/1
75 CAPSULE, EXTENDED RELEASE ORAL DAILY
Qty: 30 CAPSULE | Refills: 3 | Status: SHIPPED | OUTPATIENT
Start: 2023-08-23

## 2023-09-17 DIAGNOSIS — F41.1 GENERALIZED ANXIETY DISORDER: ICD-10-CM

## 2023-09-19 RX ORDER — ALPRAZOLAM 0.5 MG/1
TABLET ORAL
Qty: 90 TABLET | Refills: 1 | Status: SHIPPED | OUTPATIENT
Start: 2023-09-19 | End: 2024-09-17

## 2023-10-12 ENCOUNTER — OFFICE VISIT (OUTPATIENT)
Dept: FAMILY MEDICINE CLINIC | Age: 48
End: 2023-10-12
Payer: COMMERCIAL

## 2023-10-12 VITALS
WEIGHT: 251 LBS | SYSTOLIC BLOOD PRESSURE: 122 MMHG | RESPIRATION RATE: 20 BRPM | HEIGHT: 71 IN | OXYGEN SATURATION: 97 % | DIASTOLIC BLOOD PRESSURE: 74 MMHG | HEART RATE: 90 BPM | BODY MASS INDEX: 35.14 KG/M2

## 2023-10-12 DIAGNOSIS — N52.9 ERECTILE DYSFUNCTION, UNSPECIFIED ERECTILE DYSFUNCTION TYPE: ICD-10-CM

## 2023-10-12 DIAGNOSIS — F41.1 GENERALIZED ANXIETY DISORDER: Primary | ICD-10-CM

## 2023-10-12 PROCEDURE — 99214 OFFICE O/P EST MOD 30 MIN: CPT | Performed by: FAMILY MEDICINE

## 2023-10-12 RX ORDER — SILDENAFIL CITRATE 20 MG/1
TABLET ORAL
Qty: 30 TABLET | Refills: 3 | Status: SHIPPED | OUTPATIENT
Start: 2023-10-12

## 2023-10-12 ASSESSMENT — ENCOUNTER SYMPTOMS
NAUSEA: 0
SHORTNESS OF BREATH: 0
VOMITING: 0
DIARRHEA: 0

## 2023-10-15 PROBLEM — N52.9 ERECTILE DYSFUNCTION: Status: ACTIVE | Noted: 2023-10-15

## 2023-10-15 RX ORDER — VENLAFAXINE HYDROCHLORIDE 75 MG/1
75 CAPSULE, EXTENDED RELEASE ORAL DAILY
Qty: 30 CAPSULE | Refills: 3 | Status: SHIPPED
Start: 2023-10-15

## 2023-12-07 ENCOUNTER — PATIENT MESSAGE (OUTPATIENT)
Dept: FAMILY MEDICINE CLINIC | Age: 48
End: 2023-12-07

## 2023-12-07 RX ORDER — AZITHROMYCIN 250 MG/1
TABLET, FILM COATED ORAL
Qty: 6 TABLET | Refills: 0 | Status: SHIPPED | OUTPATIENT
Start: 2023-12-07

## 2023-12-07 NOTE — TELEPHONE ENCOUNTER
From: Belle Bello  To: Dr. Abdifatah Lomeli  Sent: 12/7/2023 7:09 AM EST  Subject: Sinus infection    Good morning. Zeto been fighting severe sinus headaches for last 5 days. .can you please call me in a Z jefry?

## 2023-12-30 DIAGNOSIS — F41.1 GENERALIZED ANXIETY DISORDER: ICD-10-CM

## 2024-01-03 RX ORDER — ALPRAZOLAM 0.5 MG/1
TABLET ORAL
Qty: 90 TABLET | Refills: 2 | Status: SHIPPED | OUTPATIENT
Start: 2024-01-03 | End: 2025-01-03

## 2024-01-08 DIAGNOSIS — F41.1 GENERALIZED ANXIETY DISORDER: ICD-10-CM

## 2024-01-10 RX ORDER — ALPRAZOLAM 0.5 MG/1
TABLET ORAL
Qty: 90 TABLET | Refills: 0 | Status: SHIPPED | OUTPATIENT
Start: 2024-01-10 | End: 2025-01-10

## 2024-02-09 ENCOUNTER — PATIENT MESSAGE (OUTPATIENT)
Dept: FAMILY MEDICINE CLINIC | Age: 49
End: 2024-02-09

## 2024-02-11 RX ORDER — AZITHROMYCIN 250 MG/1
TABLET, FILM COATED ORAL
Qty: 6 TABLET | Refills: 0 | Status: SHIPPED | OUTPATIENT
Start: 2024-02-11

## 2024-02-14 DIAGNOSIS — N52.9 ERECTILE DYSFUNCTION, UNSPECIFIED ERECTILE DYSFUNCTION TYPE: ICD-10-CM

## 2024-02-14 RX ORDER — SILDENAFIL CITRATE 20 MG/1
TABLET ORAL
Qty: 30 TABLET | Refills: 3 | Status: SHIPPED | OUTPATIENT
Start: 2024-02-14

## 2024-04-06 SDOH — ECONOMIC STABILITY: FOOD INSECURITY: WITHIN THE PAST 12 MONTHS, YOU WORRIED THAT YOUR FOOD WOULD RUN OUT BEFORE YOU GOT MONEY TO BUY MORE.: NEVER TRUE

## 2024-04-06 SDOH — ECONOMIC STABILITY: TRANSPORTATION INSECURITY
IN THE PAST 12 MONTHS, HAS LACK OF TRANSPORTATION KEPT YOU FROM MEETINGS, WORK, OR FROM GETTING THINGS NEEDED FOR DAILY LIVING?: NO

## 2024-04-06 SDOH — ECONOMIC STABILITY: HOUSING INSECURITY
IN THE LAST 12 MONTHS, WAS THERE A TIME WHEN YOU DID NOT HAVE A STEADY PLACE TO SLEEP OR SLEPT IN A SHELTER (INCLUDING NOW)?: NO

## 2024-04-06 SDOH — ECONOMIC STABILITY: FOOD INSECURITY: WITHIN THE PAST 12 MONTHS, THE FOOD YOU BOUGHT JUST DIDN'T LAST AND YOU DIDN'T HAVE MONEY TO GET MORE.: NEVER TRUE

## 2024-04-06 SDOH — ECONOMIC STABILITY: INCOME INSECURITY: HOW HARD IS IT FOR YOU TO PAY FOR THE VERY BASICS LIKE FOOD, HOUSING, MEDICAL CARE, AND HEATING?: NOT HARD AT ALL

## 2024-04-07 ASSESSMENT — PATIENT HEALTH QUESTIONNAIRE - PHQ9
SUM OF ALL RESPONSES TO PHQ QUESTIONS 1-9: 0
1. LITTLE INTEREST OR PLEASURE IN DOING THINGS: NOT AT ALL
2. FEELING DOWN, DEPRESSED OR HOPELESS: NOT AT ALL
SUM OF ALL RESPONSES TO PHQ QUESTIONS 1-9: 0
1. LITTLE INTEREST OR PLEASURE IN DOING THINGS: NOT AT ALL
SUM OF ALL RESPONSES TO PHQ QUESTIONS 1-9: 0
SUM OF ALL RESPONSES TO PHQ9 QUESTIONS 1 & 2: 0
SUM OF ALL RESPONSES TO PHQ9 QUESTIONS 1 & 2: 0
SUM OF ALL RESPONSES TO PHQ QUESTIONS 1-9: 0
2. FEELING DOWN, DEPRESSED OR HOPELESS: NOT AT ALL

## 2024-04-08 ENCOUNTER — OFFICE VISIT (OUTPATIENT)
Dept: FAMILY MEDICINE CLINIC | Age: 49
End: 2024-04-08
Payer: COMMERCIAL

## 2024-04-08 VITALS
BODY MASS INDEX: 36.26 KG/M2 | HEART RATE: 75 BPM | SYSTOLIC BLOOD PRESSURE: 128 MMHG | HEIGHT: 71 IN | WEIGHT: 259 LBS | RESPIRATION RATE: 20 BRPM | OXYGEN SATURATION: 91 % | DIASTOLIC BLOOD PRESSURE: 84 MMHG

## 2024-04-08 DIAGNOSIS — Z12.5 PROSTATE CANCER SCREENING: ICD-10-CM

## 2024-04-08 DIAGNOSIS — E78.2 MIXED HYPERLIPIDEMIA: ICD-10-CM

## 2024-04-08 DIAGNOSIS — F41.1 GENERALIZED ANXIETY DISORDER: Primary | ICD-10-CM

## 2024-04-08 PROCEDURE — 99214 OFFICE O/P EST MOD 30 MIN: CPT | Performed by: FAMILY MEDICINE

## 2024-04-08 RX ORDER — ARIPIPRAZOLE 2 MG/1
2 TABLET ORAL DAILY
Qty: 30 TABLET | Refills: 3 | Status: SHIPPED | OUTPATIENT
Start: 2024-04-08

## 2024-04-08 RX ORDER — ALPRAZOLAM 1 MG/1
1 TABLET ORAL 3 TIMES DAILY PRN
Qty: 90 TABLET | Refills: 2 | Status: SHIPPED | OUTPATIENT
Start: 2024-04-08 | End: 2025-04-09

## 2024-04-08 RX ORDER — VENLAFAXINE HYDROCHLORIDE 75 MG/1
75 CAPSULE, EXTENDED RELEASE ORAL DAILY
Qty: 30 CAPSULE | Refills: 3 | Status: SHIPPED | OUTPATIENT
Start: 2024-04-08

## 2024-04-08 NOTE — PROGRESS NOTES
well-developed.   Cardiovascular:      Rate and Rhythm: Normal rate and regular rhythm.      Heart sounds: Normal heart sounds. No murmur heard.     No friction rub.   Pulmonary:      Effort: Pulmonary effort is normal. No respiratory distress.      Breath sounds: Normal breath sounds. No wheezing or rales.   Abdominal:      General: Bowel sounds are normal. There is no distension.      Palpations: Abdomen is soft.      Tenderness: There is no abdominal tenderness. There is no guarding or rebound.   Skin:     General: Skin is warm and dry.   Neurological:      Mental Status: He is alert.   Psychiatric:         Attention and Perception: Attention normal.         Mood and Affect: Mood is anxious. Affect is flat.               Ananth was seen today for anxiety.    Diagnoses and all orders for this visit:    Generalized anxiety disorder  -     CBC; Future  -     Comprehensive Metabolic Panel; Future  -     TSH; Future  -     venlafaxine (EFFEXOR XR) 75 MG extended release capsule; Take 1 capsule by mouth daily  -     ALPRAZolam (XANAX) 1 MG tablet; Take 1 tablet by mouth 3 times daily as needed for Anxiety. Max Daily Amount: 3 mg  -     add ARIPiprazole (ABILIFY) 2 MG tablet; Take 1 tablet by mouth daily    Mixed hyperlipidemia  -     Lipid Panel; Future    Prostate cancer screening  -     PSA Screening; Future            Return in about 1 month (around 5/8/2024) for anxiety., or sooner if necessary.          Jamie Sylvesetr MD      Educational materials and/or home exercises printed for patient's review and were included in patient instructions onhis/her After Visit Summary and given to patient at the end of visit.      Counseled regarding above diagnosis, including possible risks andcomplications,  especially if left uncontrolled.    Counseled regarding the possible side effects, risks, benefits and alternatives to treatment; patient and/or guardian verbalizes understanding, agrees, feelscomfortable with and

## 2024-04-10 DIAGNOSIS — E78.2 MIXED HYPERLIPIDEMIA: ICD-10-CM

## 2024-04-10 DIAGNOSIS — Z12.5 PROSTATE CANCER SCREENING: ICD-10-CM

## 2024-04-10 DIAGNOSIS — F41.1 GENERALIZED ANXIETY DISORDER: ICD-10-CM

## 2024-04-10 LAB
ALBUMIN SERPL-MCNC: 4.6 G/DL (ref 3.5–5.2)
ALP BLD-CCNC: 82 U/L (ref 40–129)
ALT SERPL-CCNC: 30 U/L (ref 0–40)
ANION GAP SERPL CALCULATED.3IONS-SCNC: 18 MMOL/L (ref 7–16)
AST SERPL-CCNC: 22 U/L (ref 0–39)
BILIRUB SERPL-MCNC: 0.8 MG/DL (ref 0–1.2)
BUN BLDV-MCNC: 14 MG/DL (ref 6–20)
CALCIUM SERPL-MCNC: 9.4 MG/DL (ref 8.6–10.2)
CHLORIDE BLD-SCNC: 105 MMOL/L (ref 98–107)
CHOLESTEROL: 213 MG/DL
CO2: 20 MMOL/L (ref 22–29)
CREAT SERPL-MCNC: 1 MG/DL (ref 0.7–1.2)
GFR SERPL CREATININE-BSD FRML MDRD: >90 ML/MIN/1.73M2
GLUCOSE BLD-MCNC: 99 MG/DL (ref 74–99)
HCT VFR BLD CALC: 52.2 % (ref 37–54)
HDLC SERPL-MCNC: 46 MG/DL
HEMOGLOBIN: 17.1 G/DL (ref 12.5–16.5)
LDL CHOLESTEROL: 138 MG/DL
MCH RBC QN AUTO: 33.1 PG (ref 26–35)
MCHC RBC AUTO-ENTMCNC: 32.8 G/DL (ref 32–34.5)
MCV RBC AUTO: 101 FL (ref 80–99.9)
PDW BLD-RTO: 13.1 % (ref 11.5–15)
PLATELET # BLD: 180 K/UL (ref 130–450)
PMV BLD AUTO: 11.3 FL (ref 7–12)
POTASSIUM SERPL-SCNC: 4.5 MMOL/L (ref 3.5–5)
PROSTATE SPECIFIC ANTIGEN: 0.81 NG/ML (ref 0–4)
RBC # BLD: 5.17 M/UL (ref 3.8–5.8)
SODIUM BLD-SCNC: 143 MMOL/L (ref 132–146)
TOTAL PROTEIN: 7.2 G/DL (ref 6.4–8.3)
TRIGL SERPL-MCNC: 143 MG/DL
TSH SERPL DL<=0.05 MIU/L-ACNC: 4.36 UIU/ML (ref 0.27–4.2)
VLDLC SERPL CALC-MCNC: 29 MG/DL
WBC # BLD: 5.6 K/UL (ref 4.5–11.5)

## 2024-04-12 ENCOUNTER — PATIENT MESSAGE (OUTPATIENT)
Dept: FAMILY MEDICINE CLINIC | Age: 49
End: 2024-04-12

## 2024-04-12 DIAGNOSIS — R94.6 ABNORMAL THYROID FUNCTION TEST: Primary | ICD-10-CM

## 2024-04-12 DIAGNOSIS — E78.2 MIXED HYPERLIPIDEMIA: ICD-10-CM

## 2024-04-16 NOTE — TELEPHONE ENCOUNTER
From: Ananth Feliz  To: Dr. Jamie Sylvester  Sent: 4/12/2024 9:17 AM EDT  Subject: Ananth test results    Good afternoon....anything on results that are anything to worry about??  Wasnt sure if the meds may have made some things spike a bit...

## 2024-04-17 DIAGNOSIS — R94.6 ABNORMAL THYROID FUNCTION TEST: ICD-10-CM

## 2024-04-17 LAB — T4 FREE: 1 NG/DL (ref 0.9–1.7)

## 2024-05-07 ENCOUNTER — OFFICE VISIT (OUTPATIENT)
Dept: FAMILY MEDICINE CLINIC | Age: 49
End: 2024-05-07
Payer: COMMERCIAL

## 2024-05-07 VITALS
WEIGHT: 255 LBS | HEART RATE: 82 BPM | SYSTOLIC BLOOD PRESSURE: 128 MMHG | DIASTOLIC BLOOD PRESSURE: 84 MMHG | BODY MASS INDEX: 35.7 KG/M2 | OXYGEN SATURATION: 92 % | HEIGHT: 71 IN

## 2024-05-07 DIAGNOSIS — F41.1 GENERALIZED ANXIETY DISORDER: ICD-10-CM

## 2024-05-07 PROCEDURE — 99214 OFFICE O/P EST MOD 30 MIN: CPT | Performed by: FAMILY MEDICINE

## 2024-05-07 RX ORDER — ARIPIPRAZOLE 2 MG/1
2 TABLET ORAL DAILY
Qty: 30 TABLET | Refills: 5 | Status: SHIPPED | OUTPATIENT
Start: 2024-05-07

## 2024-05-07 RX ORDER — VENLAFAXINE HYDROCHLORIDE 75 MG/1
75 CAPSULE, EXTENDED RELEASE ORAL DAILY
Qty: 30 CAPSULE | Refills: 5 | Status: SHIPPED | OUTPATIENT
Start: 2024-05-07

## 2024-05-07 ASSESSMENT — ENCOUNTER SYMPTOMS
VOMITING: 0
NAUSEA: 0
SHORTNESS OF BREATH: 1
DIARRHEA: 0

## 2024-05-07 NOTE — PROGRESS NOTES
Chief Complaint   Patient presents with    Anxiety       Anxiety and depression:  Patient is here with complaints of anxiety and/or depression.  This is a/an recent problem.  This has been going on for several months.  Exacerbating factors include general stressors, work stressors.  Alleviating factors include Effexor and Abilify.  Patient does not have suicidal or homicidal ideation.  Patient is not having issues falling or staying asleep.  Patient is not having associated panic attacks.  The above is not interfering with quality of life. Recent lab results reviewed with patient, including CMP, CBC, TSH, and lipid panel which are remarkable for hyperlipidemia.      Patient's past medical, surgical, social and/or family history reviewed, updated in chart, and arenon-contributory (unless otherwise stated).  Medications and allergies also reviewed and updated in chart.       /84   Pulse 82   Ht 1.803 m (5' 11\")   Wt 115.7 kg (255 lb)   SpO2 92%   BMI 35.57 kg/m²     Review of Systems   Eyes:  Negative for visual disturbance.   Respiratory:  Positive for shortness of breath (with exertion).    Cardiovascular:  Negative for chest pain, palpitations and leg swelling.   Gastrointestinal:  Negative for diarrhea, nausea and vomiting.   Genitourinary:  Negative for difficulty urinating, dysuria and frequency.   Skin:  Negative for rash.   Psychiatric/Behavioral:  Negative for dysphoric mood.        Current Outpatient Medications   Medication Sig Dispense Refill    venlafaxine (EFFEXOR XR) 75 MG extended release capsule Take 1 capsule by mouth daily 30 capsule 5    ARIPiprazole (ABILIFY) 2 MG tablet Take 1 tablet by mouth daily 30 tablet 5    ALPRAZolam (XANAX) 1 MG tablet Take 1 tablet by mouth 3 times daily as needed for Anxiety. Max Daily Amount: 3 mg 90 tablet 2    sildenafil (REVATIO) 20 MG tablet Take 60-80 mg twice weekly prn erectile dysfunction 30 tablet 3    albuterol sulfate HFA (VENTOLIN HFA) 108 (90

## 2024-06-01 DIAGNOSIS — N52.9 ERECTILE DYSFUNCTION, UNSPECIFIED ERECTILE DYSFUNCTION TYPE: ICD-10-CM

## 2024-06-02 DIAGNOSIS — F41.1 GENERALIZED ANXIETY DISORDER: ICD-10-CM

## 2024-06-03 NOTE — TELEPHONE ENCOUNTER
Last seen 5/7/2024  Next appt 6/2/2024    Requested Prescriptions     Pending Prescriptions Disp Refills    sildenafil (REVATIO) 20 MG tablet [Pharmacy Med Name: SILDENAFIL 20 MG TABLET] 30 tablet 3     Sig: take 3 to 4 tablets by mouth TWICE A WEEK if needed ERECTILE DYSFUNCTION

## 2024-06-03 NOTE — TELEPHONE ENCOUNTER
Last seen 5/7/2024  Next appt 8/13/2024    Requested Prescriptions     Pending Prescriptions Disp Refills    albuterol sulfate HFA (VENTOLIN HFA) 108 (90 Base) MCG/ACT inhaler 18 g 2     Sig: Inhale 2 puffs into the lungs 4 times daily as needed for Wheezing    ARIPiprazole (ABILIFY) 2 MG tablet 30 tablet 5     Sig: Take 1 tablet by mouth daily

## 2024-06-04 RX ORDER — SILDENAFIL CITRATE 20 MG/1
TABLET ORAL
Qty: 30 TABLET | Refills: 3 | Status: SHIPPED | OUTPATIENT
Start: 2024-06-04

## 2024-06-04 RX ORDER — ARIPIPRAZOLE 2 MG/1
2 TABLET ORAL DAILY
Qty: 30 TABLET | Refills: 5 | Status: SHIPPED | OUTPATIENT
Start: 2024-06-04

## 2024-06-04 RX ORDER — ALBUTEROL SULFATE 90 UG/1
2 AEROSOL, METERED RESPIRATORY (INHALATION) 4 TIMES DAILY PRN
Qty: 18 G | Refills: 2 | Status: SHIPPED | OUTPATIENT
Start: 2024-06-04

## 2024-08-07 DIAGNOSIS — F41.1 GENERALIZED ANXIETY DISORDER: ICD-10-CM

## 2024-08-07 DIAGNOSIS — N52.9 ERECTILE DYSFUNCTION, UNSPECIFIED ERECTILE DYSFUNCTION TYPE: ICD-10-CM

## 2024-08-07 NOTE — TELEPHONE ENCOUNTER
Last seen 5/7/2024  Next appt 8/13/2024    Requested Prescriptions     Pending Prescriptions Disp Refills    ALPRAZolam (XANAX) 1 MG tablet 90 tablet 2     Sig: Take 1 tablet by mouth 3 times daily as needed for Anxiety. Max Daily Amount: 3 mg    venlafaxine (EFFEXOR XR) 75 MG extended release capsule 30 capsule 5     Sig: Take 1 capsule by mouth daily    ARIPiprazole (ABILIFY) 2 MG tablet 30 tablet 5     Sig: Take 1 tablet by mouth daily

## 2024-08-07 NOTE — TELEPHONE ENCOUNTER
Last seen 5/7/2024  Next appt 8/13/2024    Requested Prescriptions     Pending Prescriptions Disp Refills    sildenafil (REVATIO) 20 MG tablet 30 tablet 3     Sig: take 3 to 4 tablets by mouth TWICE A WEEK if needed ERECTILE DYSFUNCTION

## 2024-08-08 RX ORDER — VENLAFAXINE HYDROCHLORIDE 75 MG/1
75 CAPSULE, EXTENDED RELEASE ORAL DAILY
Qty: 30 CAPSULE | Refills: 2 | Status: SHIPPED | OUTPATIENT
Start: 2024-08-08

## 2024-08-08 RX ORDER — SILDENAFIL CITRATE 20 MG/1
TABLET ORAL
Qty: 30 TABLET | Refills: 3 | OUTPATIENT
Start: 2024-08-08

## 2024-08-08 RX ORDER — ALPRAZOLAM 1 MG/1
1 TABLET ORAL 3 TIMES DAILY PRN
Qty: 90 TABLET | Refills: 2 | Status: SHIPPED | OUTPATIENT
Start: 2024-08-08 | End: 2025-08-09

## 2024-08-08 RX ORDER — SILDENAFIL CITRATE 20 MG/1
TABLET ORAL
Qty: 30 TABLET | Refills: 3 | Status: SHIPPED | OUTPATIENT
Start: 2024-08-08

## 2024-08-08 RX ORDER — ARIPIPRAZOLE 2 MG/1
2 TABLET ORAL DAILY
Qty: 30 TABLET | Refills: 2 | Status: SHIPPED | OUTPATIENT
Start: 2024-08-08

## 2024-08-13 ENCOUNTER — OFFICE VISIT (OUTPATIENT)
Dept: FAMILY MEDICINE CLINIC | Age: 49
End: 2024-08-13
Payer: COMMERCIAL

## 2024-08-13 VITALS
RESPIRATION RATE: 20 BRPM | BODY MASS INDEX: 35.84 KG/M2 | OXYGEN SATURATION: 94 % | WEIGHT: 256 LBS | SYSTOLIC BLOOD PRESSURE: 132 MMHG | HEART RATE: 78 BPM | HEIGHT: 71 IN | DIASTOLIC BLOOD PRESSURE: 88 MMHG

## 2024-08-13 DIAGNOSIS — F41.1 GENERALIZED ANXIETY DISORDER: Primary | ICD-10-CM

## 2024-08-13 DIAGNOSIS — Z12.11 COLON CANCER SCREENING: ICD-10-CM

## 2024-08-13 PROCEDURE — G8417 CALC BMI ABV UP PARAM F/U: HCPCS | Performed by: FAMILY MEDICINE

## 2024-08-13 PROCEDURE — 3017F COLORECTAL CA SCREEN DOC REV: CPT | Performed by: FAMILY MEDICINE

## 2024-08-13 PROCEDURE — 99214 OFFICE O/P EST MOD 30 MIN: CPT | Performed by: FAMILY MEDICINE

## 2024-08-13 PROCEDURE — G8427 DOCREV CUR MEDS BY ELIG CLIN: HCPCS | Performed by: FAMILY MEDICINE

## 2024-08-13 PROCEDURE — 4004F PT TOBACCO SCREEN RCVD TLK: CPT | Performed by: FAMILY MEDICINE

## 2024-08-13 ASSESSMENT — ENCOUNTER SYMPTOMS
DIARRHEA: 0
NAUSEA: 0
SHORTNESS OF BREATH: 1
VOMITING: 0

## 2024-08-13 NOTE — PROGRESS NOTES
Chief Complaint   Patient presents with    Anxiety       Anxiety and depression:  Patient is here with complaints of anxiety and depression.  This is a chronic problem.  This has been going on for several years.  Exacerbating factors include general stressors.  Alleviating factors include Effexor XR, Abilify,  Xanax prn.  Patient does not have suicidal or homicidal ideation.  Patient is occasionally  having issues falling or staying asleep.  Patient is not having associated panic attacks.  The above is not interfering with quality of life. Recent lab results reviewed with patient, including CMP, CBC, TSH, and lipid panel which are not remarkable.      Patient's past medical, surgical, social and/or family history reviewed, updated in chart, and arenon-contributory (unless otherwise stated).  Medications and allergies also reviewed and updated in chart.       /88   Pulse 78   Resp 20   Ht 1.803 m (5' 11\")   Wt 116.1 kg (256 lb)   SpO2 94%   BMI 35.70 kg/m²     Review of Systems   Eyes:  Negative for visual disturbance.   Respiratory:  Positive for shortness of breath (with exertion, stress).    Cardiovascular:  Negative for chest pain, palpitations and leg swelling.   Gastrointestinal:  Negative for diarrhea, nausea and vomiting.   Genitourinary:  Negative for difficulty urinating, dysuria and frequency.   Skin:  Negative for rash.   Psychiatric/Behavioral:  Negative for dysphoric mood.        Current Outpatient Medications   Medication Sig Dispense Refill    ALPRAZolam (XANAX) 1 MG tablet Take 1 tablet by mouth 3 times daily as needed for Anxiety. Max Daily Amount: 3 mg 90 tablet 2    venlafaxine (EFFEXOR XR) 75 MG extended release capsule Take 1 capsule by mouth daily 30 capsule 2    ARIPiprazole (ABILIFY) 2 MG tablet Take 1 tablet by mouth daily 30 tablet 2    sildenafil (REVATIO) 20 MG tablet take 3 to 4 tablets by mouth TWICE A WEEK if needed ERECTILE DYSFUNCTION 30 tablet 3    albuterol sulfate HFA

## 2024-08-18 RX ORDER — ALPRAZOLAM 1 MG/1
1 TABLET ORAL 3 TIMES DAILY PRN
Qty: 90 TABLET | Refills: 2
Start: 2024-08-18 | End: 2025-08-19

## 2024-08-18 RX ORDER — ARIPIPRAZOLE 2 MG/1
2 TABLET ORAL DAILY
Qty: 30 TABLET | Refills: 2
Start: 2024-08-18

## 2024-08-18 RX ORDER — VENLAFAXINE HYDROCHLORIDE 75 MG/1
75 CAPSULE, EXTENDED RELEASE ORAL DAILY
Qty: 30 CAPSULE | Refills: 2
Start: 2024-08-18

## 2024-11-05 DIAGNOSIS — F41.1 GENERALIZED ANXIETY DISORDER: ICD-10-CM

## 2024-11-05 NOTE — TELEPHONE ENCOUNTER
Name of Medication(s) Requested:  Requested Prescriptions     Pending Prescriptions Disp Refills    venlafaxine (EFFEXOR XR) 75 MG extended release capsule [Pharmacy Med Name: VENLAFAXINE HCL ER 75 MG CAP] 30 capsule 2     Sig: TAKE 1 CAPSULE BY MOUTH EVERY DAY    ARIPiprazole (ABILIFY) 2 MG tablet [Pharmacy Med Name: ARIPIPRAZOLE 2 MG TABLET] 30 tablet 2     Sig: TAKE 1 TABLET BY MOUTH EVERY DAY       Medication is on current medication list Yes    Dosage and directions were verified? Yes    Quantity verified: 30 day supply     Pharmacy Verified?  Yes    Last Appointment:  8/13/2024    Future appts:  Future Appointments   Date Time Provider Department Center   2/13/2025  3:30 PM Jamie Sylvester MD [facility]        (If no appt send self scheduling link. .REFILLAPPT)  Scheduling request sent?     [] Yes  [x] No    Does patient need updated?  [] Yes  [x] No

## 2024-11-06 RX ORDER — ARIPIPRAZOLE 2 MG/1
2 TABLET ORAL DAILY
Qty: 30 TABLET | Refills: 2 | Status: SHIPPED | OUTPATIENT
Start: 2024-11-06

## 2024-11-06 RX ORDER — VENLAFAXINE HYDROCHLORIDE 75 MG/1
CAPSULE, EXTENDED RELEASE ORAL DAILY
Qty: 30 CAPSULE | Refills: 2 | Status: SHIPPED | OUTPATIENT
Start: 2024-11-06

## 2024-11-25 DIAGNOSIS — N52.9 ERECTILE DYSFUNCTION, UNSPECIFIED ERECTILE DYSFUNCTION TYPE: ICD-10-CM

## 2024-11-25 DIAGNOSIS — F41.1 GENERALIZED ANXIETY DISORDER: ICD-10-CM

## 2024-11-26 NOTE — TELEPHONE ENCOUNTER
Last seen 8/13/2024  Next appt 2/13/2025    Requested Prescriptions     Pending Prescriptions Disp Refills    sildenafil (REVATIO) 20 MG tablet 30 tablet 3     Sig: take 3 to 4 tablets by mouth TWICE A WEEK if needed ERECTILE DYSFUNCTION    ALPRAZolam (XANAX) 1 MG tablet 90 tablet 2     Sig: Take 1 tablet by mouth 3 times daily as needed for Anxiety. Max Daily Amount: 3 mg    venlafaxine (EFFEXOR XR) 75 MG extended release capsule 30 capsule 2     Sig: Take by mouth daily    ARIPiprazole (ABILIFY) 2 MG tablet 30 tablet 2     Sig: Take 1 tablet by mouth daily

## 2024-11-27 RX ORDER — SILDENAFIL CITRATE 20 MG/1
TABLET ORAL
Qty: 30 TABLET | Refills: 3 | Status: SHIPPED | OUTPATIENT
Start: 2024-11-27

## 2024-11-27 RX ORDER — ARIPIPRAZOLE 2 MG/1
2 TABLET ORAL DAILY
Qty: 30 TABLET | Refills: 2 | Status: SHIPPED | OUTPATIENT
Start: 2024-11-27

## 2024-11-27 RX ORDER — ALPRAZOLAM 1 MG/1
1 TABLET ORAL 3 TIMES DAILY PRN
Qty: 90 TABLET | Refills: 2 | Status: SHIPPED | OUTPATIENT
Start: 2024-11-27 | End: 2025-11-28

## 2024-11-27 RX ORDER — VENLAFAXINE HYDROCHLORIDE 75 MG/1
75 CAPSULE, EXTENDED RELEASE ORAL DAILY
Qty: 30 CAPSULE | Refills: 2 | Status: SHIPPED | OUTPATIENT
Start: 2024-11-27

## 2025-02-10 SDOH — ECONOMIC STABILITY: TRANSPORTATION INSECURITY
IN THE PAST 12 MONTHS, HAS THE LACK OF TRANSPORTATION KEPT YOU FROM MEDICAL APPOINTMENTS OR FROM GETTING MEDICATIONS?: NO

## 2025-02-10 SDOH — ECONOMIC STABILITY: FOOD INSECURITY: WITHIN THE PAST 12 MONTHS, THE FOOD YOU BOUGHT JUST DIDN'T LAST AND YOU DIDN'T HAVE MONEY TO GET MORE.: NEVER TRUE

## 2025-02-10 SDOH — ECONOMIC STABILITY: FOOD INSECURITY: WITHIN THE PAST 12 MONTHS, YOU WORRIED THAT YOUR FOOD WOULD RUN OUT BEFORE YOU GOT MONEY TO BUY MORE.: NEVER TRUE

## 2025-02-10 SDOH — ECONOMIC STABILITY: INCOME INSECURITY: IN THE LAST 12 MONTHS, WAS THERE A TIME WHEN YOU WERE NOT ABLE TO PAY THE MORTGAGE OR RENT ON TIME?: NO

## 2025-02-10 ASSESSMENT — PATIENT HEALTH QUESTIONNAIRE - PHQ9
SUM OF ALL RESPONSES TO PHQ QUESTIONS 1-9: 0
SUM OF ALL RESPONSES TO PHQ QUESTIONS 1-9: 0
1. LITTLE INTEREST OR PLEASURE IN DOING THINGS: NOT AT ALL
1. LITTLE INTEREST OR PLEASURE IN DOING THINGS: NOT AT ALL
SUM OF ALL RESPONSES TO PHQ9 QUESTIONS 1 & 2: 0
SUM OF ALL RESPONSES TO PHQ9 QUESTIONS 1 & 2: 0
2. FEELING DOWN, DEPRESSED OR HOPELESS: NOT AT ALL
SUM OF ALL RESPONSES TO PHQ QUESTIONS 1-9: 0
2. FEELING DOWN, DEPRESSED OR HOPELESS: NOT AT ALL
SUM OF ALL RESPONSES TO PHQ QUESTIONS 1-9: 0

## 2025-02-13 ENCOUNTER — OFFICE VISIT (OUTPATIENT)
Dept: FAMILY MEDICINE CLINIC | Age: 50
End: 2025-02-13
Payer: COMMERCIAL

## 2025-02-13 VITALS
BODY MASS INDEX: 36.54 KG/M2 | HEART RATE: 86 BPM | DIASTOLIC BLOOD PRESSURE: 74 MMHG | HEIGHT: 71 IN | SYSTOLIC BLOOD PRESSURE: 122 MMHG | WEIGHT: 261 LBS | OXYGEN SATURATION: 100 % | RESPIRATION RATE: 20 BRPM

## 2025-02-13 DIAGNOSIS — F41.1 GENERALIZED ANXIETY DISORDER: Primary | ICD-10-CM

## 2025-02-13 DIAGNOSIS — Z12.5 PROSTATE CANCER SCREENING: ICD-10-CM

## 2025-02-13 DIAGNOSIS — E78.2 MIXED HYPERLIPIDEMIA: ICD-10-CM

## 2025-02-13 PROCEDURE — 99214 OFFICE O/P EST MOD 30 MIN: CPT | Performed by: FAMILY MEDICINE

## 2025-02-13 RX ORDER — ALPRAZOLAM 1 MG/1
1 TABLET ORAL 3 TIMES DAILY PRN
Qty: 90 TABLET | Refills: 2 | Status: SHIPPED | OUTPATIENT
Start: 2025-02-13 | End: 2026-02-14

## 2025-02-13 SDOH — ECONOMIC STABILITY: FOOD INSECURITY: WITHIN THE PAST 12 MONTHS, THE FOOD YOU BOUGHT JUST DIDN'T LAST AND YOU DIDN'T HAVE MONEY TO GET MORE.: NEVER TRUE

## 2025-02-13 SDOH — ECONOMIC STABILITY: FOOD INSECURITY: WITHIN THE PAST 12 MONTHS, YOU WORRIED THAT YOUR FOOD WOULD RUN OUT BEFORE YOU GOT MONEY TO BUY MORE.: NEVER TRUE

## 2025-02-13 NOTE — PROGRESS NOTES
[address], [address]   [place] [address]   [phone]   Jamie Sylvester MD     Patient: Ananth Feliz  Dateof Birth: 1975  Visit Date: 2/13/25    Ananth is a 49 y.o. year old male here today for   Chief Complaint   Patient presents with    Anxiety       HPI  History of Present Illness  The patient is a 49-year-old male who presents for a 6-month follow-up of anxiety.    He reports a general improvement in his anxiety and mood, with occasional episodes necessitating the use of Xanax. He is currently on a regimen of Effexor XR 75 mg and Abilify, which he finds effective. He does not endorse any thoughts of self-harm related to his anxiety.    He has been experiencing sleep disturbances for several months, characterized by awakenings every 2 to 3 hours. Despite these interruptions, he feels well-rested after approximately 3 hours of sleep. He has not attempted to use any sleep aids such as melatonin or Tylenol PM. He acknowledges that Xanax induces sleep within 20 minutes when taken sublingually, but it does not prevent awakenings during the night. He has recently transitioned from smokeless tobacco to nicotine vaping, which he believes may be contributing to his sleep issues. He is making efforts to reduce his vaping habit, including purchasing nicotine-free products. He also reports occasional shortness of breath associated with vaping.    Supplemental Information  He does not report any skin changes, visual disturbances, chest pain, nausea, vomiting, diarrhea, or urinary symptoms.    SOCIAL HISTORY  The patient stopped using smokeless tobacco 6 months ago and started vaping nicotine.                  Past medical, surgical, social and/or family historyreviewed, updated as needed, and are non-contributory (unless otherwise stated).  Medications, allergies, and problem list also reviewed and updated as needed in patient's record.     Current Outpatient

## 2025-02-15 RX ORDER — VENLAFAXINE HYDROCHLORIDE 75 MG/1
75 CAPSULE, EXTENDED RELEASE ORAL DAILY
Qty: 30 CAPSULE | Refills: 2
Start: 2025-02-15

## 2025-03-13 ENCOUNTER — PATIENT MESSAGE (OUTPATIENT)
Dept: FAMILY MEDICINE CLINIC | Age: 50
End: 2025-03-13

## 2025-04-08 DIAGNOSIS — N52.9 ERECTILE DYSFUNCTION, UNSPECIFIED ERECTILE DYSFUNCTION TYPE: ICD-10-CM

## 2025-04-08 RX ORDER — SILDENAFIL CITRATE 20 MG/1
TABLET ORAL
Qty: 30 TABLET | Refills: 3 | Status: SHIPPED | OUTPATIENT
Start: 2025-04-08

## 2025-04-08 NOTE — TELEPHONE ENCOUNTER
Name of Medication(s) Requested:  Requested Prescriptions     Pending Prescriptions Disp Refills    sildenafil (REVATIO) 20 MG tablet [Pharmacy Med Name: SILDENAFIL 20 MG TABLET] 30 tablet 3     Sig: TAKE 3 TO 4 TABLETS BY MOUTH TWICE A WEEK IF NEEDED ERECTILE DYSFUNCTION       Medication is on current medication list Yes    Dosage and directions were verified? Yes    Quantity verified: 30 day supply     Pharmacy Verified?  Yes    Last Appointment:  2/13/2025    Future appts:  Future Appointments   Date Time Provider Department Center   8/13/2025  3:15 PM Jamie Sylvester MD [facility]        (If no appt send self scheduling link. .REFILLAPPT)  Scheduling request sent?     [] Yes  [x] No    Does patient need updated?  [] Yes  [x] No

## 2025-05-08 DIAGNOSIS — F41.1 GENERALIZED ANXIETY DISORDER: ICD-10-CM

## 2025-05-08 RX ORDER — ARIPIPRAZOLE 2 MG/1
2 TABLET ORAL DAILY
Qty: 30 TABLET | Refills: 3 | Status: SHIPPED | OUTPATIENT
Start: 2025-05-08

## 2025-05-08 RX ORDER — VENLAFAXINE HYDROCHLORIDE 75 MG/1
CAPSULE, EXTENDED RELEASE ORAL DAILY
Qty: 30 CAPSULE | Refills: 3 | Status: SHIPPED | OUTPATIENT
Start: 2025-05-08

## 2025-07-29 ENCOUNTER — PATIENT MESSAGE (OUTPATIENT)
Dept: FAMILY MEDICINE CLINIC | Age: 50
End: 2025-07-29

## 2025-07-29 ENCOUNTER — TELEPHONE (OUTPATIENT)
Dept: FAMILY MEDICINE CLINIC | Age: 50
End: 2025-07-29

## 2025-07-29 NOTE — TELEPHONE ENCOUNTER
Pt called [facility] had to do a drug screen today  for fed -x  and they need a letter for his medication that is xanax  if he could get this today call him [phone]

## 2025-07-31 NOTE — TELEPHONE ENCOUNTER
Pt called back states there is a form he will drop off needs filled out  for this physical he had just needs a list of meds from   will leave with a email roger no # [phone]

## 2025-08-12 DIAGNOSIS — F41.1 GENERALIZED ANXIETY DISORDER: ICD-10-CM

## 2025-08-12 DIAGNOSIS — E78.2 MIXED HYPERLIPIDEMIA: ICD-10-CM

## 2025-08-12 DIAGNOSIS — Z12.5 PROSTATE CANCER SCREENING: ICD-10-CM

## 2025-08-12 LAB
ALBUMIN: 4.3 G/DL (ref 3.5–5.2)
ALP BLD-CCNC: 79 U/L (ref 40–129)
ALT SERPL-CCNC: 52 U/L (ref 0–50)
ANION GAP SERPL CALCULATED.3IONS-SCNC: 13 MMOL/L (ref 7–16)
AST SERPL-CCNC: 30 U/L (ref 0–50)
BILIRUB SERPL-MCNC: 0.4 MG/DL (ref 0–1.2)
BUN BLDV-MCNC: 21 MG/DL (ref 6–20)
CALCIUM SERPL-MCNC: 9.2 MG/DL (ref 8.6–10)
CHLORIDE BLD-SCNC: 106 MMOL/L (ref 98–107)
CHOLESTEROL, TOTAL: 189 MG/DL
CO2: 23 MMOL/L (ref 22–29)
CREAT SERPL-MCNC: 1.2 MG/DL (ref 0.7–1.2)
GFR, ESTIMATED: 72 ML/MIN/1.73M2
GLUCOSE BLD-MCNC: 90 MG/DL (ref 74–99)
HCT VFR BLD CALC: 52.6 % (ref 37–54)
HDLC SERPL-MCNC: 42 MG/DL
HEMOGLOBIN: 17.3 G/DL (ref 12.5–16.5)
LDL CHOLESTEROL: 113 MG/DL
MCH RBC QN AUTO: 33 PG (ref 26–35)
MCHC RBC AUTO-ENTMCNC: 32.9 G/DL (ref 32–34.5)
MCV RBC AUTO: 100.4 FL (ref 80–99.9)
PDW BLD-RTO: 13 % (ref 11.5–15)
PLATELET # BLD: 207 K/UL (ref 130–450)
PMV BLD AUTO: 11.2 FL (ref 7–12)
POTASSIUM SERPL-SCNC: 4.3 MMOL/L (ref 3.5–5.1)
PROSTATE SPECIFIC ANTIGEN: 0.55 NG/ML (ref 0–4)
RBC # BLD: 5.24 M/UL (ref 3.8–5.8)
SODIUM BLD-SCNC: 142 MMOL/L (ref 136–145)
TOTAL PROTEIN: 7.3 G/DL (ref 6.4–8.3)
TRIGL SERPL-MCNC: 171 MG/DL
TSH SERPL DL<=0.05 MIU/L-ACNC: 3.58 UIU/ML (ref 0.27–4.2)
VLDLC SERPL CALC-MCNC: 34 MG/DL
WBC # BLD: 6.6 K/UL (ref 4.5–11.5)

## 2025-08-13 ENCOUNTER — OFFICE VISIT (OUTPATIENT)
Dept: FAMILY MEDICINE CLINIC | Age: 50
End: 2025-08-13

## 2025-08-13 VITALS
SYSTOLIC BLOOD PRESSURE: 136 MMHG | BODY MASS INDEX: 37.1 KG/M2 | WEIGHT: 265 LBS | RESPIRATION RATE: 20 BRPM | HEIGHT: 71 IN | DIASTOLIC BLOOD PRESSURE: 84 MMHG | OXYGEN SATURATION: 96 % | HEART RATE: 84 BPM

## 2025-08-13 DIAGNOSIS — F41.1 GENERALIZED ANXIETY DISORDER: ICD-10-CM

## 2025-08-13 RX ORDER — ARIPIPRAZOLE 2 MG/1
2 TABLET ORAL DAILY
Qty: 30 TABLET | Refills: 5 | Status: SHIPPED | OUTPATIENT
Start: 2025-08-13

## 2025-08-13 RX ORDER — ALPRAZOLAM 1 MG/1
1 TABLET ORAL NIGHTLY PRN
Qty: 30 TABLET | Refills: 3 | Status: SHIPPED | OUTPATIENT
Start: 2025-08-13 | End: 2026-08-14

## 2025-08-13 RX ORDER — VENLAFAXINE HYDROCHLORIDE 75 MG/1
75 CAPSULE, EXTENDED RELEASE ORAL DAILY
Qty: 30 CAPSULE | Refills: 5 | Status: SHIPPED | OUTPATIENT
Start: 2025-08-13

## 2025-08-13 RX ORDER — BUSPIRONE HYDROCHLORIDE 5 MG/1
5 TABLET ORAL 2 TIMES DAILY PRN
Qty: 60 TABLET | Refills: 5 | Status: SHIPPED | OUTPATIENT
Start: 2025-08-13

## (undated) DEVICE — MEDI-VAC YANKAUER SUCTION HANDLE W/BULBOUS TIP: Brand: CARDINAL HEALTH

## (undated) DEVICE — YANKAUER,BULB TIP,W/O VENT,RIGID,STERILE: Brand: MEDLINE

## (undated) DEVICE — SPONGE,LAP,12"X12",XR,ST,5/PK,40PK/CS: Brand: MEDLINE

## (undated) DEVICE — COVER,LIGHT HANDLE,FLX,1/PK: Brand: MEDLINE INDUSTRIES, INC.

## (undated) DEVICE — GLOVE ORANGE PI 8 1/2   MSG9085

## (undated) DEVICE — STOCKINETTE: Brand: CONVERTORS

## (undated) DEVICE — ELECTRODE PT RET AD L9FT HI MOIST COND ADH HYDRGEL CORDED

## (undated) DEVICE — APPLICATOR MEDICATED 26 CC SOLUTION HI LT ORNG CHLORAPREP

## (undated) DEVICE — TOWEL,OR,DSP,ST,BLUE,STD,6/PK,12PK/CS: Brand: MEDLINE

## (undated) DEVICE — INTENDED FOR TISSUE SEPARATION, AND OTHER PROCEDURES THAT REQUIRE A SHARP SURGICAL BLADE TO PUNCTURE OR CUT.: Brand: BARD-PARKER ® STAINLESS STEEL BLADES

## (undated) DEVICE — DRESSING PETRO W3XL8IN N ADH OIL EMUL GZ CURAD

## (undated) DEVICE — STRIP,CLOSURE,WOUND,MEDI-STRIP,1/2X4: Brand: MEDLINE

## (undated) DEVICE — SHEET,DRAPE,40X58,STERILE: Brand: MEDLINE

## (undated) DEVICE — MARKER,SKIN,WI/RULER AND LABELS: Brand: MEDLINE

## (undated) DEVICE — KIT PT CARE SCHLEIN ULT SHLDR POS

## (undated) DEVICE — GAUZE,SPONGE,4"X4",16PLY,STRL,LF,10/TRAY: Brand: MEDLINE

## (undated) DEVICE — GLOVE SURG SZ 9 THK91MIL LTX FREE SYN POLYISOPRENE ANTI

## (undated) DEVICE — BANDAGE,SELF ADHRNT,COFLEX,4"X5YD,STRL: Brand: COLABEL

## (undated) DEVICE — DBD-PACK,SHOULDER III: Brand: MEDLINE

## (undated) DEVICE — Device

## (undated) DEVICE — NDL CNTR 40CT FM MAG: Brand: MEDLINE INDUSTRIES, INC.

## (undated) DEVICE — TUBING, SUCTION, 1/4" X 10', STRAIGHT: Brand: MEDLINE

## (undated) DEVICE — SET MAJOR INSTR ORTHO

## (undated) DEVICE — SMARTGOWN BREATHABLE SURGICAL GOWN: Brand: CONVERTORS

## (undated) DEVICE — SOLUTION IV IRRIG POUR BRL 0.9% SODIUM CHL 2F7124

## (undated) DEVICE — PENCIL ES L3M BTTN SWCH HOLSTER W/ BLDE ELECTRD EDGE

## (undated) DEVICE — DOUBLE BASIN SET: Brand: MEDLINE INDUSTRIES, INC.

## (undated) DEVICE — GOWN,SIRUS,POLYRNF,BRTHSLV,XLN/XL,20/CS: Brand: MEDLINE

## (undated) DEVICE — PAD,ABDOMINAL,8"X10",ST,LF: Brand: MEDLINE